# Patient Record
Sex: FEMALE | Race: OTHER | URBAN - METROPOLITAN AREA
[De-identification: names, ages, dates, MRNs, and addresses within clinical notes are randomized per-mention and may not be internally consistent; named-entity substitution may affect disease eponyms.]

---

## 2022-06-09 ENCOUNTER — APPOINTMENT (OUTPATIENT)
Dept: ULTRASOUND IMAGING | Age: 27
End: 2022-06-09
Attending: FAMILY MEDICINE
Payer: MEDICAID

## 2022-06-09 ENCOUNTER — HOSPITAL ENCOUNTER (EMERGENCY)
Age: 27
Discharge: HOME | End: 2022-06-09
Attending: EMERGENCY MEDICINE
Payer: MEDICAID

## 2022-06-09 VITALS
TEMPERATURE: 97.9 F | RESPIRATION RATE: 16 BRPM | HEIGHT: 66 IN | WEIGHT: 185 LBS | OXYGEN SATURATION: 100 % | HEART RATE: 75 BPM | BODY MASS INDEX: 29.73 KG/M2 | DIASTOLIC BLOOD PRESSURE: 78 MMHG | SYSTOLIC BLOOD PRESSURE: 131 MMHG

## 2022-06-09 DIAGNOSIS — O26.891 ABDOMINAL PAIN DURING PREGNANCY IN FIRST TRIMESTER: Primary | ICD-10-CM

## 2022-06-09 DIAGNOSIS — R10.9 ABDOMINAL PAIN DURING PREGNANCY IN FIRST TRIMESTER: Primary | ICD-10-CM

## 2022-06-09 LAB
ALBUMIN SERPL-MCNC: 3.4 G/DL (ref 3.5–5)
ALBUMIN/GLOB SERPL: 0.9 {RATIO} (ref 1.1–2.2)
ALP SERPL-CCNC: 87 U/L (ref 45–117)
ALT SERPL-CCNC: 17 U/L (ref 12–78)
ANION GAP SERPL CALC-SCNC: 6 MMOL/L (ref 5–15)
APPEARANCE UR: CLEAR
AST SERPL-CCNC: 15 U/L (ref 15–37)
BACTERIA URNS QL MICRO: ABNORMAL /HPF
BASOPHILS # BLD: 0.1 K/UL (ref 0–0.1)
BASOPHILS NFR BLD: 1 % (ref 0–1)
BILIRUB SERPL-MCNC: 0.4 MG/DL (ref 0.2–1)
BILIRUB UR QL: NEGATIVE
BUN SERPL-MCNC: 8 MG/DL (ref 6–20)
BUN/CREAT SERPL: 11 (ref 12–20)
CALCIUM SERPL-MCNC: 8.9 MG/DL (ref 8.5–10.1)
CHLORIDE SERPL-SCNC: 109 MMOL/L (ref 97–108)
CO2 SERPL-SCNC: 24 MMOL/L (ref 21–32)
COLOR UR: ABNORMAL
CREAT SERPL-MCNC: 0.75 MG/DL (ref 0.55–1.02)
DIFFERENTIAL METHOD BLD: ABNORMAL
EOSINOPHIL # BLD: 0.2 K/UL (ref 0–0.4)
EOSINOPHIL NFR BLD: 2 % (ref 0–7)
EPITH CASTS URNS QL MICRO: ABNORMAL /LPF
ERYTHROCYTE [DISTWIDTH] IN BLOOD BY AUTOMATED COUNT: 14.6 % (ref 11.5–14.5)
GLOBULIN SER CALC-MCNC: 4 G/DL (ref 2–4)
GLUCOSE SERPL-MCNC: 120 MG/DL (ref 65–100)
GLUCOSE UR STRIP.AUTO-MCNC: NEGATIVE MG/DL
HCG SERPL-ACNC: 1049 MIU/ML (ref 0–6)
HCT VFR BLD AUTO: 37.5 % (ref 35–47)
HGB BLD-MCNC: 12.9 G/DL (ref 11.5–16)
HGB UR QL STRIP: ABNORMAL
IMM GRANULOCYTES # BLD AUTO: 0 K/UL (ref 0–0.04)
IMM GRANULOCYTES NFR BLD AUTO: 0 % (ref 0–0.5)
KETONES UR QL STRIP.AUTO: NEGATIVE MG/DL
LEUKOCYTE ESTERASE UR QL STRIP.AUTO: ABNORMAL
LIPASE SERPL-CCNC: 88 U/L (ref 73–393)
LYMPHOCYTES # BLD: 2.5 K/UL (ref 0.8–3.5)
LYMPHOCYTES NFR BLD: 27 % (ref 12–49)
MCH RBC QN AUTO: 29.3 PG (ref 26–34)
MCHC RBC AUTO-ENTMCNC: 34.4 G/DL (ref 30–36.5)
MCV RBC AUTO: 85 FL (ref 80–99)
MONOCYTES # BLD: 0.4 K/UL (ref 0–1)
MONOCYTES NFR BLD: 4 % (ref 5–13)
NEUTS SEG # BLD: 6.3 K/UL (ref 1.8–8)
NEUTS SEG NFR BLD: 66 % (ref 32–75)
NITRITE UR QL STRIP.AUTO: NEGATIVE
NRBC # BLD: 0 K/UL (ref 0–0.01)
NRBC BLD-RTO: 0 PER 100 WBC
PH UR STRIP: 5 [PH] (ref 5–8)
PLATELET # BLD AUTO: 311 K/UL (ref 150–400)
PMV BLD AUTO: 9.9 FL (ref 8.9–12.9)
POTASSIUM SERPL-SCNC: 3.5 MMOL/L (ref 3.5–5.1)
PROT SERPL-MCNC: 7.4 G/DL (ref 6.4–8.2)
PROT UR STRIP-MCNC: NEGATIVE MG/DL
RBC # BLD AUTO: 4.41 M/UL (ref 3.8–5.2)
RBC #/AREA URNS HPF: ABNORMAL /HPF (ref 0–5)
SODIUM SERPL-SCNC: 139 MMOL/L (ref 136–145)
SP GR UR REFRACTOMETRY: 1.01 (ref 1–1.03)
UR CULT HOLD, URHOLD: NORMAL
UROBILINOGEN UR QL STRIP.AUTO: 0.2 EU/DL (ref 0.2–1)
WBC # BLD AUTO: 9.5 K/UL (ref 3.6–11)
WBC URNS QL MICRO: ABNORMAL /HPF (ref 0–4)

## 2022-06-09 PROCEDURE — 85025 COMPLETE CBC W/AUTO DIFF WBC: CPT

## 2022-06-09 PROCEDURE — 76817 TRANSVAGINAL US OBSTETRIC: CPT

## 2022-06-09 PROCEDURE — 84702 CHORIONIC GONADOTROPIN TEST: CPT

## 2022-06-09 PROCEDURE — 99284 EMERGENCY DEPT VISIT MOD MDM: CPT

## 2022-06-09 PROCEDURE — 80053 COMPREHEN METABOLIC PANEL: CPT

## 2022-06-09 PROCEDURE — 87086 URINE CULTURE/COLONY COUNT: CPT

## 2022-06-09 PROCEDURE — 36415 COLL VENOUS BLD VENIPUNCTURE: CPT

## 2022-06-09 PROCEDURE — 83690 ASSAY OF LIPASE: CPT

## 2022-06-09 PROCEDURE — 76815 OB US LIMITED FETUS(S): CPT

## 2022-06-09 PROCEDURE — 76801 OB US < 14 WKS SINGLE FETUS: CPT

## 2022-06-09 PROCEDURE — 81001 URINALYSIS AUTO W/SCOPE: CPT

## 2022-06-09 NOTE — ED TRIAGE NOTES
Pt arrives to ED for LLQ pain. States started yesterday and today is spotting.  Pt had positive home pregnancy test. LMP 5/1

## 2022-06-09 NOTE — DISCHARGE INSTRUCTIONS
Thank you for allowing us to provide you with medical care today. We realize that you have many choices for your emergency care needs. We thank you for choosing [address] [place] [address]. Please choose us in the future for any continued health care needs. The exam and treatment you received in the emergency department were for an emergent problem and are not intended as complete care. It is important that you follow-up with a doctor. If your symptoms worsen or you do not improve should return to the emergency department. We are available 24 hours a day. Please make an appointment with your health care provider for follow-up of your emergency department visit. Take this sheet with you when you go to your follow-up visit.

## 2022-06-09 NOTE — ED PROVIDER NOTES
Patient is a 61-year-old female with no past medical history presenting with concerns for abdominal pain in early pregnancy. She reports her last menstrual period started on 5/1. She took a home pregnancy test approximately 2 weeks ago and this was positive. She noted that she has had some intermittent abdominal pain over the last 20 days. However, last night she felt that the pain worsened. She then noted some spotting. She reports the spotting was initially clear, but then became pink. She denies saturating multiple pads. She has never been pregnant before. She does have a OB/GYN appointment tomorrow, but her pregnancy has not been confirmed with blood test or ultrasound. No past medical history on file. No past surgical history on file. No family history on file. Social History     Socioeconomic History    Marital status: Not on file     Spouse name: Not on file    Number of children: Not on file    Years of education: Not on file    Highest education level: Not on file   Occupational History    Not on file   Tobacco Use    Smoking status: Not on file    Smokeless tobacco: Not on file   Substance and Sexual Activity    Alcohol use: Not on file    Drug use: Not on file    Sexual activity: Not on file   Other Topics Concern    Not on file   Social History Narrative    Not on file     Social Determinants of Health     Financial Resource Strain:     Difficulty of Paying Living Expenses: Not on file   Food Insecurity:     Worried About Running Out of Food in the Last Year: Not on file    Deb of Food in the Last Year: Not on file   Transportation Needs:     Lack of Transportation (Medical): Not on file    Lack of Transportation (Non-Medical):  Not on file   Physical Activity:     Days of Exercise per Week: Not on file    Minutes of Exercise per Session: Not on file   Stress:     Feeling of Stress : Not on file   Social Connections:     Frequency of Communication with Friends and Family: Not on file    Frequency of Social Gatherings with Friends and Family: Not on file    Attends Jain Services: Not on file    Active Member of Clubs or Organizations: Not on file    Attends Club or Organization Meetings: Not on file    Marital Status: Not on file   Intimate Partner Violence:     Fear of Current or Ex-Partner: Not on file    Emotionally Abused: Not on file    Physically Abused: Not on file    Sexually Abused: Not on file   Housing Stability:     Unable to Pay for Housing in the Last Year: Not on file    Number of [place] in the Last Year: Not on file    Unstable Housing in the Last Year: Not on file         ALLERGIES: Patient has no known allergies. Review of Systems   Constitutional: Negative for unexpected weight change. HENT: Negative for congestion. Eyes: Negative for visual disturbance. Respiratory: Negative for cough, chest tightness and shortness of breath. Cardiovascular: Negative for chest pain. Gastrointestinal: Positive for abdominal pain. Endocrine: Negative for polyuria. Genitourinary: Negative for dysuria and flank pain. Musculoskeletal: Negative for back pain. Skin: Negative for color change. Allergic/Immunologic: Negative for immunocompromised state. Neurological: Negative for dizziness and headaches. Hematological: Negative for adenopathy. Psychiatric/Behavioral: Negative for agitation. Vitals:    06/09/22 1612   BP: 131/78   Pulse: 75   Resp: 16   Temp: 97.9 °F (36.6 °C)   SpO2: 100%   Weight: 83.9 kg (185 lb)   Height: 5' 6\" (1.676 m)            Physical Exam  Vitals and nursing note reviewed. Constitutional:       Appearance: She is well-developed and normal weight. HENT:      Head: Atraumatic. Cardiovascular:      Rate and Rhythm: Normal rate. Pulmonary:      Effort: Pulmonary effort is normal. No respiratory distress. Abdominal:      General: Abdomen is flat.  Bowel sounds are normal.      Palpations: Abdomen is soft. Tenderness: There is abdominal tenderness in the left lower quadrant. There is no right CVA tenderness, left CVA tenderness, guarding or rebound. Negative signs include Matos's sign, Rovsing's sign and McBurney's sign. Hernia: No hernia is present. Skin:     General: Skin is warm and dry. Neurological:      General: No focal deficit present. Mental Status: She is alert and oriented to person, place, and time. Psychiatric:         Mood and Affect: Mood normal.         Behavior: Behavior normal.          MDM  Number of Diagnoses or Management Options  Abdominal pain during pregnancy in first trimester  Diagnosis management comments: Patient presenting with abdominal pain in early pregnancy. Abdominal exam is unremarkable with no rebound or guarding. Patient has stable vital signs, she is not heavily bleeding. Will obtain ultrasound to rule out ectopic pregnancy. Will obtain CBC, CMP, lipase to evaluate for other etiology of abdominal pain. 1840 -ultrasounds revealing intrauterine pregnancy estimation 5 weeks. Remainder of labs unremarkable, no other clear etiology for patient's abdominal pain, normal CBC, normal creatinine, normal bili, normal liver enzymes. Lipase nonelevated. Beta-hCG appropriately correlating. Patient has follow-up scheduled for tomorrow. Encourage patient to keep this follow-up. Discussed my clinical impression(s), any labs and/or radiology results with the patient. I answered any questions and addressed any concerns. Discussed the importance of following up with their primary care physician and/or specialist(s). Discussed signs or symptoms that would warrant return back to the ER for further evaluation. The patient is agreeable with discharge.        Amount and/or Complexity of Data Reviewed  Clinical lab tests: reviewed and ordered  Tests in the radiology section of CPT®: ordered and reviewed Procedures

## 2022-06-10 LAB
BACTERIA SPEC CULT: NORMAL
SERVICE CMNT-IMP: NORMAL

## 2022-07-19 NOTE — PROGRESS NOTES
Current pregnancy history:    Cassidy Joseph is a 27 y.o. female who presents for the evaluation of pregnancy. Patient's last menstrual period was 2022. LMP history:  The date of her LMP is not certain. Her last menstrual period was normal and lasted for 4 to 5 days. A urine pregnancy test was positive 7 weeks ago. She was not on the pill at conception. Based on her LMP, her EDC is 23 and her EGA is 11 weeks,3 days. Her menstrual cycles are regular and occur approximately every 28 days  and range from 3 to 5 days. The last menses lasted the usual number of days. Ultrasound data:  She had an  ultrasound done by the ultrasound tech today which revealed a viable fabian pregnancy with a gestational age of 5 weeks and 1 days giving an EDC of 23. TA ULTRASOUND PERFORMED  A SINGLE VIABLE 11W1D IUP IS SEEN WITH NORMAL CARDIAC RHYTHM. [facility]ST[facility]. A NORMAL PLACENTA IS SEEN. THERE APPEARS TO BE A SUBCHORIONIC HEMORRHAGE THAT IS MEASURING 17 X 5 X 18MM. RIGHT OVARY APPEARS WITHIN NORMAL LIMITS. LEFT OVARY APPEARS WITHIN NORMAL LIMITS. NO FREE FLUID SEEN IN THE CDS. Pregnancy symptoms:    Since her LMP she has experienced  urinary frequency, breast tenderness, and nausea. She has not been vomiting over the last few weeks. Associated signs and symptoms which she denies: dysuria, discharge, vaginal bleeding. She states she has gained weight:  Approximately 8 pounds over the last few weeks. Relevant past pregnancy history:   She has the following pregnancy history: Her last pregnancy was uncomplicated. She has no history of  delivery. Relevant past medical history:(relevant to this pregnancy): noncontributory. Pap/Occupational history:  Last pap smear: last year Results: The patient has never had a pap smear. Her occupation is: currently not employed.      Substance history: negative for alcohol, tobacco and street drugs. Positive for nothing. Exposure history: There is/are no indoor cat/s in the home. She denies close contact with children on a regular basis. She has had chicken pox or the vaccine in the past.   Patient denies issues with domestic violence. Genetic Screening/Teratology Counseling: (Includes patient, baby's father, or anyone in either family with:)  3.  Patient's age >/= 28 at [facility]?-- no  .   2. Thalassemia ([facility], [facility], [address], or  background): MCV<80?--no.     3.  Neural tube defect (meningomyelocele, spina bifida, anencephaly)?--no.   4.  Congenital heart defect?--no.  5.  Down syndrome?--no.   6.  Ravinder-Sachs (Spiritism, [facility])?--no.   7.  Canavan's Disease?--no.   8.  Familial Dysautonomia?--no.   9.  Sickle cell disease or trait ()? --no   The patient has not been tested for sickle trait  10. Hemophilia or other blood disorders?--no. 11.  Muscular dystrophy?--no. 12.  Cystic fibrosis?--no. 13.  [place]'s Chorea?--no. 14.  Mental retardation/autism (if yes was person tested for Fragile X)?--no. 15.  Other inherited genetic or chromosomal disorder?--no. 12.  Maternal metabolic disorder (DM, PKU, etc)?--no. 17.  Patient or FOB with a child with a birth defect not listed above?--no.  17a. Patient or FOB with a birth defect themselves?--no. 18.  Recurrent pregnancy loss, or stillbirth?--no. 19.  Any medications since LMP other than prenatal vitamins (include vitamins,  supplements, OTC meds, drugs, alcohol)?--no. 20.  Any other genetic/environmental exposure to discuss?--no. Infection History:  1. Lives with someone with TB or TB exposed?--no.   2.  Patient or partner has history of genital herpes?--no.  3.  Rash or viral illness since LMP?--no.    4.  History of STD (GC, CT, HPV, syphilis, HIV)? --no   5. Other: OTHER? History reviewed. No pertinent past medical history.   Past Surgical History:   Procedure Laterality Date    HX APPENDECTOMY  2018     Social History     Occupational History    Not on file   Tobacco Use    Smoking status: Never    Smokeless tobacco: Never   Vaping Use    Vaping Use: Never used   Substance and Sexual Activity    Alcohol use: Not Currently    Drug use: Never    Sexual activity: Yes     Partners: Male     Birth control/protection: None     Family History   Problem Relation Age of Onset    Hypertension Mother     Asthma Father     Hypertension Maternal Grandfather        No Known Allergies  Prior to Admission medications    Not on File      OB History    Para Term  AB Living   3       2     SAB IAB Ectopic Molar Multiple Live Births     2              # Outcome Date GA Lbr Camacho/2nd Weight Sex Delivery Anes PTL Lv   3 Current            2 IAB            1 IAB                Review of Systems: History obtained from the patient  Constitutional: negative for weight loss, fever, night sweats  HEENT: negative for hearing loss, earache, congestion, snoring, sorethroat  CV: negative for chest pain, palpitations, edema  Resp: negative for cough, shortness of breath, wheezing  Breast: negative for breast lumps, nipple discharge, galactorrhea  GI: negative for change in bowel habits, abdominal pain, black or bloody stools  : negative for frequency, dysuria, hematuria, vaginal discharge  MSK: negative for back pain, joint pain, muscle pain  Skin: negative for itching, rash, hives  Neuro: negative for dizziness, headache, confusion, weakness  Psych: negative for anxiety, depression, change in mood  Heme/lymph: negative for bleeding, bruising, pallor    Objective:  Visit Vitals  /78   Wt 199 lb 6.4 oz (90.4 kg)   LMP 2022   BMI 32.18 kg/m²       Physical Exam:   PHYSICAL EXAMINATION    Constitutional  Appearance: well-nourished, well developed, alert, in no acute distress    HENT  Head  Face: appears normal  Eyes: appear normal  Ears: normal  Mouth: normal  Lips: no lesions    Neck  Inspection/Palpation: normal appearance, no masses or tenderness  Lymph Nodes: no lymphadenopathy present  Thyroid: gland size normal, nontender, no nodules or masses present on palpation    Chest  Respiratory Effort: breathing unlabored  Auscultation: normal breath sounds    Cardiovascular  Heart: Auscultation: regular rate and rhythm without murmur    Breasts  Inspection of Breasts: breasts symmetrical, no skin changes, no discharge present, nipple appearance normal, no skin retraction present  Palpation of Breasts and Axillae: no masses present on palpation, no breast tenderness  Axillary Lymph Nodes: no lymphadenopathy present    Gastrointestinal  Abdominal Examination: abdomen non-tender to palpation, normal bowel sounds, no masses present  Liver and spleen: no hepatomegaly present, spleen not palpable  Hernias: no hernias identified    Genitourinary  External Genitalia: normal appearance for age, no discharge present, no tenderness present, no inflammatory lesions present, no masses present, no atrophy present  Vagina: normal vaginal vault without central or paravaginal defects, no discharge present, no inflammatory lesions present, no masses present  Bladder: non-tender to palpation  Urethra: appears normal  Cervix: normal   Uterus: enlarged, normal shape, soft  Adnexa: no adnexal tenderness present, no adnexal masses present  Perineum: perineum within normal limits, no evidence of trauma, no rashes or skin lesions present  Anus: anus within normal limits, no hemorrhoids present  Inguinal Lymph Nodes: no lymphadenopathy present    Skin  General Inspection: no rash, no lesions identified    Neurologic/Psychiatric  Mental Status:  Orientation: grossly oriented to person, place and time  Mood and Affect: mood normal, affect appropriate    Assessment:   Intrauterine pregnancy with the following problems identified:   EDC 2/7/2023 by US (unsure LMP)  NIPTS? Horizon?   Covid vaccine - needs booster  Low dose ASA        Plan:     Offered CF testing, CVS, Nuchal Translucency, MSAFP, amnio, and discussed NIPT  Course of pregnancy discussed including visit schedule, routine U/S, glucola testing, etc.  Avoid alcoholic beverages and illicit/recreational drugs use  Take prenatal vitamins or folic acid daily. Hospital and practice style discussed with coverage system. Discussed nutrition, toxoplasmosis precautions, sexual activity, exercise, need for influenza vaccine, environmental and work hazards, travel advice, screen for domestic violence, need for seat belts. Discussed seafood, unpasteurized dairy products, deli meat, artificial sweeteners, and caffeine. Information on prenatal classes/breastfeeding given. Information on circumcision given  Patient encouraged not to smoke. Discussed current prescription drug use. Given medication list.  Discussed the use of over the counter medications and chemicals. Route of delivery discussed, including risks, benefits, and alternatives of  versus repeat LTCS. Pt understands risk of hemorrhage during pregnancy and post delivery and would accept blood products if necessary in life-threatening emergencies      Handouts given to pt.

## 2022-07-20 ENCOUNTER — INITIAL PRENATAL (OUTPATIENT)
Dept: OBGYN CLINIC | Age: 27
End: 2022-07-20

## 2022-07-20 VITALS — BODY MASS INDEX: 32.18 KG/M2 | WEIGHT: 199.4 LBS | DIASTOLIC BLOOD PRESSURE: 78 MMHG | SYSTOLIC BLOOD PRESSURE: 131 MMHG

## 2022-07-20 DIAGNOSIS — Z34.91 ENCOUNTER FOR SUPERVISION OF NORMAL PREGNANCY IN FIRST TRIMESTER, UNSPECIFIED GRAVIDITY: ICD-10-CM

## 2022-07-20 DIAGNOSIS — Z34.80 SUPERVISION OF OTHER NORMAL PREGNANCY, ANTEPARTUM: Primary | ICD-10-CM

## 2022-07-20 LAB
ANTIBODY SCREEN, EXTERNAL: NEGATIVE
HBSAG, EXTERNAL: NEGATIVE
HEPATITIS C AB,   EXT: NEGATIVE
HIV, EXTERNAL: NEGATIVE
RUBELLA, EXTERNAL: NORMAL
T. PALLIDUM, EXTERNAL: NON REACTIVE
TYPE, ABO & RH, EXTERNAL: NORMAL

## 2022-07-20 PROCEDURE — 0502F SUBSEQUENT PRENATAL CARE: CPT | Performed by: OBSTETRICS & GYNECOLOGY

## 2022-07-21 ENCOUNTER — PATIENT MESSAGE (OUTPATIENT)
Dept: OBGYN CLINIC | Age: 27
End: 2022-07-21

## 2022-07-21 LAB
ABO + RH BLD: NORMAL
BLOOD BANK CMNT PATIENT-IMP: NORMAL
BLOOD GROUP ANTIBODIES SERPL: NORMAL
ERYTHROCYTE [DISTWIDTH] IN BLOOD BY AUTOMATED COUNT: 15.5 % (ref 11.5–14.5)
HBV SURFACE AG SER QL: <0.1 INDEX
HBV SURFACE AG SER QL: NEGATIVE
HCT VFR BLD AUTO: 36.2 % (ref 35–47)
HCV AB SERPL QL IA: NONREACTIVE
HGB BLD-MCNC: 12 G/DL (ref 11.5–16)
HIV 1+2 AB+HIV1 P24 AG SERPL QL IA: NONREACTIVE
HIV12 RESULT COMMENT, HHIVC: NORMAL
MCH RBC QN AUTO: 28.9 PG (ref 26–34)
MCHC RBC AUTO-ENTMCNC: 33.1 G/DL (ref 30–36.5)
MCV RBC AUTO: 87.2 FL (ref 80–99)
NRBC # BLD: 0 K/UL (ref 0–0.01)
NRBC BLD-RTO: 0 PER 100 WBC
PLATELET # BLD AUTO: 315 K/UL (ref 150–400)
PMV BLD AUTO: 10.3 FL (ref 8.9–12.9)
RBC # BLD AUTO: 4.15 M/UL (ref 3.8–5.2)
RUBV IGG SER-IMP: REACTIVE
RUBV IGG SERPL IA-ACNC: 26.1 IU/ML
SPECIMEN EXP DATE BLD: NORMAL
WBC # BLD AUTO: 11.7 K/UL (ref 3.6–11)

## 2022-07-22 LAB — T PALLIDUM AB SER QL IA: NON REACTIVE

## 2022-07-23 LAB
BACTERIA SPEC CULT: NORMAL
SERVICE CMNT-IMP: NORMAL

## 2022-07-24 LAB
A VAGINAE DNA VAG QL NAA+PROBE: ABNORMAL SCORE
BVAB2 DNA VAG QL NAA+PROBE: ABNORMAL SCORE
C ALBICANS DNA VAG QL NAA+PROBE: POSITIVE
C GLABRATA DNA VAG QL NAA+PROBE: NEGATIVE
C TRACH DNA VAG QL NAA+PROBE: NEGATIVE
MEGA1 DNA VAG QL NAA+PROBE: ABNORMAL SCORE
N GONORRHOEA DNA VAG QL NAA+PROBE: NEGATIVE
SPECIMEN STATUS REPORT, ROLRST: NORMAL
T VAGINALIS DNA VAG QL NAA+PROBE: POSITIVE

## 2022-07-26 RX ORDER — METRONIDAZOLE 500 MG/1
500 TABLET ORAL 2 TIMES DAILY
Qty: 14 TABLET | Refills: 0 | Status: SHIPPED | OUTPATIENT
Start: 2022-07-26 | End: 2022-08-02

## 2022-07-26 RX ORDER — TERCONAZOLE 8 MG/G
1 CREAM VAGINAL
Qty: 20 G | Refills: 0 | Status: SHIPPED | OUTPATIENT
Start: 2022-07-26

## 2022-08-01 PROBLEM — Z34.80 SUPERVISION OF OTHER NORMAL PREGNANCY, ANTEPARTUM: Status: ACTIVE | Noted: 2022-08-01

## 2022-08-01 LAB
CYTOLOGIST CVX/VAG CYTO: NORMAL
CYTOLOGY CVX/VAG DOC CYTO: NORMAL
CYTOLOGY CVX/VAG DOC THIN PREP: NORMAL
DX ICD CODE: NORMAL
LABCORP, 190119: NORMAL
Lab: NORMAL
OTHER STN SPEC: NORMAL
STAT OF ADQ CVX/VAG CYTO-IMP: NORMAL

## 2022-08-03 ENCOUNTER — HOSPITAL ENCOUNTER (OUTPATIENT)
Dept: PERINATAL CARE | Age: 27
Discharge: HOME | End: 2022-08-03
Attending: OBSTETRICS & GYNECOLOGY
Payer: MEDICAID

## 2022-08-03 PROCEDURE — 76813 OB US NUCHAL MEAS 1 GEST: CPT | Performed by: OBSTETRICS & GYNECOLOGY

## 2022-08-17 ENCOUNTER — ROUTINE PRENATAL (OUTPATIENT)
Dept: OBGYN CLINIC | Age: 27
End: 2022-08-17

## 2022-08-17 VITALS — DIASTOLIC BLOOD PRESSURE: 79 MMHG | SYSTOLIC BLOOD PRESSURE: 123 MMHG | WEIGHT: 202 LBS | BODY MASS INDEX: 32.6 KG/M2

## 2022-08-17 DIAGNOSIS — Z34.91 ENCOUNTER FOR SUPERVISION OF NORMAL PREGNANCY IN FIRST TRIMESTER, UNSPECIFIED GRAVIDITY: Primary | ICD-10-CM

## 2022-08-17 DIAGNOSIS — Z34.80 SUPERVISION OF OTHER NORMAL PREGNANCY, ANTEPARTUM: ICD-10-CM

## 2022-08-17 PROCEDURE — 0502F SUBSEQUENT PRENATAL CARE: CPT | Performed by: OBSTETRICS & GYNECOLOGY

## 2022-08-17 NOTE — PROGRESS NOTES
Retest for trich - both treated  AFP today  US in 4 weeks    Horizon pos SC trait - advised needs to bring FOB for testing next visit

## 2022-08-17 NOTE — PROGRESS NOTES
No uterine anomaly on MFM scan   [facility] resolved   Trich treated - retest next visit  Yeast-treated  NIPTS normal male

## 2022-08-19 LAB
AFP INTERP SERPL-IMP: NORMAL
AFP INTERP SERPL-IMP: NORMAL
AFP MOM SERPL: 1.59
AFP SERPL-MCNC: 40.3 NG/ML
AGE AT DELIVERY: 25.2 YR
COMMENT, 018013: NORMAL
GA METHOD: NORMAL
GA: 15 WEEKS
IDDM PATIENT QL: NO
MULTIPLE PREGNANCY: NO
NEURAL TUBE DEFECT RISK FETUS: 2155 %
RESULTS, 017004: NORMAL

## 2022-08-20 LAB
C TRACH RRNA SPEC QL NAA+PROBE: NEGATIVE
N GONORRHOEA RRNA SPEC QL NAA+PROBE: NEGATIVE
T VAGINALIS RRNA SPEC QL NAA+PROBE: NEGATIVE

## 2022-09-21 ENCOUNTER — ROUTINE PRENATAL (OUTPATIENT)
Dept: OBGYN CLINIC | Age: 27
End: 2022-09-21

## 2022-09-21 VITALS — DIASTOLIC BLOOD PRESSURE: 75 MMHG | BODY MASS INDEX: 34.22 KG/M2 | SYSTOLIC BLOOD PRESSURE: 130 MMHG | WEIGHT: 212 LBS

## 2022-09-21 DIAGNOSIS — O28.3 ABNORMAL ULTRASONIC FINDING ON ANTENATAL SCREENING OF MOTHER: ICD-10-CM

## 2022-09-21 DIAGNOSIS — Z34.91 ENCOUNTER FOR SUPERVISION OF NORMAL PREGNANCY IN FIRST TRIMESTER, UNSPECIFIED GRAVIDITY: ICD-10-CM

## 2022-09-21 DIAGNOSIS — O26.899 VAGINAL DISCHARGE DURING PREGNANCY, ANTEPARTUM: Primary | ICD-10-CM

## 2022-09-21 DIAGNOSIS — N89.8 VAGINAL DISCHARGE DURING PREGNANCY, ANTEPARTUM: Primary | ICD-10-CM

## 2022-09-21 PROCEDURE — 0502F SUBSEQUENT PRENATAL CARE: CPT | Performed by: OBSTETRICS & GYNECOLOGY

## 2022-09-21 RX ORDER — TERCONAZOLE 8 MG/G
1 CREAM VAGINAL
Qty: 20 G | Refills: 0 | Status: SHIPPED | OUTPATIENT
Start: 2022-09-21

## 2022-09-21 NOTE — PROGRESS NOTES
US today marginal cord insertion.  IVEF - see MFM  Baby moving    Complains of green discharge - test for trich was neg last visit  Yeast discharge in vagina    FOB getting tested today

## 2022-09-21 NOTE — PROGRESS NOTES
No uterine anomaly on MFM scan   [facility] resolved   Trich treated - retest next visit- neg  Yeast-treated  NIPTS normal male  Horizon pos sickle trait - test FOB- redraw Horizon at next visit  AFP- neg

## 2022-09-24 LAB
A VAGINAE DNA VAG QL NAA+PROBE: ABNORMAL SCORE
BVAB2 DNA VAG QL NAA+PROBE: ABNORMAL SCORE
C ALBICANS DNA VAG QL NAA+PROBE: POSITIVE
C GLABRATA DNA VAG QL NAA+PROBE: NEGATIVE
C TRACH DNA VAG QL NAA+PROBE: NEGATIVE
MEGA1 DNA VAG QL NAA+PROBE: ABNORMAL SCORE
N GONORRHOEA DNA VAG QL NAA+PROBE: NEGATIVE
T VAGINALIS DNA VAG QL NAA+PROBE: NEGATIVE

## 2022-09-26 RX ORDER — METRONIDAZOLE 500 MG/1
500 TABLET ORAL 2 TIMES DAILY
Qty: 14 TABLET | Refills: 0 | Status: SHIPPED | OUTPATIENT
Start: 2022-09-26 | End: 2022-10-03

## 2022-09-26 RX ORDER — TERCONAZOLE 8 MG/G
1 CREAM VAGINAL
Qty: 20 G | Refills: 1 | Status: SHIPPED | OUTPATIENT
Start: 2022-09-26

## 2022-10-19 ENCOUNTER — HOSPITAL ENCOUNTER (OUTPATIENT)
Dept: PERINATAL CARE | Age: 27
Discharge: HOME | End: 2022-10-19
Attending: OBSTETRICS & GYNECOLOGY
Payer: MEDICAID

## 2022-10-19 ENCOUNTER — ROUTINE PRENATAL (OUTPATIENT)
Dept: OBGYN CLINIC | Age: 27
End: 2022-10-19
Payer: MEDICAID

## 2022-10-19 VITALS
SYSTOLIC BLOOD PRESSURE: 119 MMHG | HEIGHT: 66 IN | DIASTOLIC BLOOD PRESSURE: 73 MMHG | WEIGHT: 223.4 LBS | BODY MASS INDEX: 35.9 KG/M2

## 2022-10-19 DIAGNOSIS — Z34.80 SUPERVISION OF OTHER NORMAL PREGNANCY, ANTEPARTUM: Primary | ICD-10-CM

## 2022-10-19 DIAGNOSIS — Z23 ENCOUNTER FOR IMMUNIZATION: ICD-10-CM

## 2022-10-19 PROCEDURE — 90686 IIV4 VACC NO PRSV 0.5 ML IM: CPT

## 2022-10-19 PROCEDURE — 76811 OB US DETAILED SNGL FETUS: CPT | Performed by: OBSTETRICS & GYNECOLOGY

## 2022-10-19 PROCEDURE — 0502F SUBSEQUENT PRENATAL CARE: CPT | Performed by: OBSTETRICS & GYNECOLOGY

## 2022-10-19 PROCEDURE — 90471 IMMUNIZATION ADMIN: CPT

## 2022-10-19 NOTE — PROGRESS NOTES
No uterine anomaly on MFM scan   [facility] resolved   Trich treated - retest next visit- neg  Yeast-treated  NIPTS normal male  Horizon pos sickle trait - test FOB- redraw Horizon at next visit- negative  AFP- neg

## 2022-10-19 NOTE — PROGRESS NOTES
Baby moving  No contractions or leaking  Saw MFM today - has no fu scheduled - apparently had IVEF  Flu vaccine today  glucola next visit    Due to language barrier, an  was present during the history-taking and subsequent discussion (and for part of the physical exam) with this patient.

## 2022-11-14 ENCOUNTER — ROUTINE PRENATAL (OUTPATIENT)
Dept: OBGYN CLINIC | Age: 27
End: 2022-11-14
Payer: MEDICAID

## 2022-11-14 VITALS — SYSTOLIC BLOOD PRESSURE: 127 MMHG | WEIGHT: 228.4 LBS | DIASTOLIC BLOOD PRESSURE: 77 MMHG | BODY MASS INDEX: 36.86 KG/M2

## 2022-11-14 DIAGNOSIS — Z34.80 SUPERVISION OF OTHER NORMAL PREGNANCY, ANTEPARTUM: Primary | ICD-10-CM

## 2022-11-14 PROCEDURE — 90471 IMMUNIZATION ADMIN: CPT | Performed by: OBSTETRICS & GYNECOLOGY

## 2022-11-14 PROCEDURE — 0502F SUBSEQUENT PRENATAL CARE: CPT | Performed by: OBSTETRICS & GYNECOLOGY

## 2022-11-14 PROCEDURE — 90715 TDAP VACCINE 7 YRS/> IM: CPT

## 2022-11-14 NOTE — PROGRESS NOTES
EDC 2/7/23 by US  No uterine anomaly on MFM scan   [facility] resolved   Trich treated - retest next visit- neg  Yeast-treated  NIPTS normal male  Horizon pos sickle trait - test FOB- redraw Horizon at next visit- negative  AFP- neg  Low dose ASA  Labs checked TH  Flu vaccine 10/19/22

## 2022-11-15 LAB
ERYTHROCYTE [DISTWIDTH] IN BLOOD BY AUTOMATED COUNT: 14.2 % (ref 11.5–14.5)
GLUCOSE 1H P 100 G GLC PO SERPL-MCNC: 90 MG/DL (ref 65–140)
HCT VFR BLD AUTO: 35.2 % (ref 35–47)
HGB BLD-MCNC: 11.6 G/DL (ref 11.5–16)
MCH RBC QN AUTO: 31.4 PG (ref 26–34)
MCHC RBC AUTO-ENTMCNC: 33 G/DL (ref 30–36.5)
MCV RBC AUTO: 95.1 FL (ref 80–99)
NRBC # BLD: 0 K/UL (ref 0–0.01)
NRBC BLD-RTO: 0 PER 100 WBC
PLATELET # BLD AUTO: 246 K/UL (ref 150–400)
PMV BLD AUTO: 10.5 FL (ref 8.9–12.9)
RBC # BLD AUTO: 3.7 M/UL (ref 3.8–5.2)
WBC # BLD AUTO: 15 K/UL (ref 3.6–11)

## 2022-11-28 ENCOUNTER — ROUTINE PRENATAL (OUTPATIENT)
Dept: OBGYN CLINIC | Age: 27
End: 2022-11-28
Payer: MEDICAID

## 2022-11-28 VITALS — BODY MASS INDEX: 37.48 KG/M2 | SYSTOLIC BLOOD PRESSURE: 119 MMHG | WEIGHT: 232.2 LBS | DIASTOLIC BLOOD PRESSURE: 74 MMHG

## 2022-11-28 DIAGNOSIS — Z3A.29 29 WEEKS GESTATION OF PREGNANCY: ICD-10-CM

## 2022-11-28 DIAGNOSIS — O99.213 OBESITY AFFECTING PREGNANCY IN THIRD TRIMESTER: ICD-10-CM

## 2022-11-28 DIAGNOSIS — Z34.80 SUPERVISION OF OTHER NORMAL PREGNANCY, ANTEPARTUM: Primary | ICD-10-CM

## 2022-11-28 PROCEDURE — 0502F SUBSEQUENT PRENATAL CARE: CPT | Performed by: OBSTETRICS & GYNECOLOGY

## 2022-11-28 NOTE — PROGRESS NOTES
EDC 2/7/23 by US  No uterine anomaly on MFM scan   [facility] resolved   Trich treated - retest next visit- neg  Yeast-treated  NIPTS normal male  Horizon pos sickle trait - test FOB- redraw Horizon at next visit- negative  AFP- neg  Low dose ASA  Labs checked TH  Flu vaccine 10/19/22  1hr GTT- normal

## 2022-12-08 ENCOUNTER — HOSPITAL ENCOUNTER (OUTPATIENT)
Dept: PERINATAL CARE | Age: 27
Discharge: HOME | End: 2022-12-08
Attending: OBSTETRICS & GYNECOLOGY
Payer: MEDICAID

## 2022-12-08 PROCEDURE — 76816 OB US FOLLOW-UP PER FETUS: CPT | Performed by: OBSTETRICS & GYNECOLOGY

## 2022-12-12 ENCOUNTER — ROUTINE PRENATAL (OUTPATIENT)
Dept: OBGYN CLINIC | Age: 27
End: 2022-12-12
Payer: MEDICAID

## 2022-12-12 VITALS — BODY MASS INDEX: 38.06 KG/M2 | SYSTOLIC BLOOD PRESSURE: 114 MMHG | DIASTOLIC BLOOD PRESSURE: 73 MMHG | WEIGHT: 235.8 LBS

## 2022-12-12 DIAGNOSIS — Z34.80 SUPERVISION OF OTHER NORMAL PREGNANCY, ANTEPARTUM: Primary | ICD-10-CM

## 2022-12-12 DIAGNOSIS — O40.3XX0 POLYHYDRAMNIOS IN THIRD TRIMESTER COMPLICATION, SINGLE OR UNSPECIFIED FETUS: ICD-10-CM

## 2022-12-12 DIAGNOSIS — O99.213 OBESITY AFFECTING PREGNANCY IN THIRD TRIMESTER: ICD-10-CM

## 2022-12-12 PROCEDURE — 0502F SUBSEQUENT PRENATAL CARE: CPT | Performed by: OBSTETRICS & GYNECOLOGY

## 2022-12-12 NOTE — PROGRESS NOTES
Patient had ultrasound at [facility]. AC greater than the 95th percentile. Polyhydramnios    Baby moving  Follow-up 2 weeks. Patient has [facility] follow-up in 2 weeks.

## 2022-12-12 NOTE — PROGRESS NOTES
EDC 2/7/23 by US  No uterine anomaly on MFM scan   [facility] resolved   Trich treated - retest next visit- neg  Yeast-treated  NIPTS normal male  Horizon pos sickle trait - test FOB- redraw Horizon at next visit- negative  AFP- neg  Low dose ASA  Labs checked TH  Flu vaccine 10/19/22  1hr GTT- normal

## 2022-12-22 ENCOUNTER — HOSPITAL ENCOUNTER (OUTPATIENT)
Dept: PERINATAL CARE | Age: 27
Discharge: HOME | End: 2022-12-22
Attending: OBSTETRICS & GYNECOLOGY
Payer: MEDICAID

## 2022-12-22 PROCEDURE — 76819 FETAL BIOPHYS PROFIL W/O NST: CPT | Performed by: OBSTETRICS & GYNECOLOGY

## 2022-12-27 ENCOUNTER — ROUTINE PRENATAL (OUTPATIENT)
Dept: OBGYN CLINIC | Age: 27
End: 2022-12-27
Payer: MEDICAID

## 2022-12-27 VITALS
BODY MASS INDEX: 38.41 KG/M2 | DIASTOLIC BLOOD PRESSURE: 70 MMHG | WEIGHT: 238 LBS | HEART RATE: 92 BPM | SYSTOLIC BLOOD PRESSURE: 110 MMHG

## 2022-12-27 DIAGNOSIS — O40.3XX0 POLYHYDRAMNIOS IN THIRD TRIMESTER COMPLICATION, SINGLE OR UNSPECIFIED FETUS: ICD-10-CM

## 2022-12-27 DIAGNOSIS — Z3A.34 34 WEEKS GESTATION OF PREGNANCY: ICD-10-CM

## 2022-12-27 DIAGNOSIS — O99.213 OBESITY AFFECTING PREGNANCY IN THIRD TRIMESTER: ICD-10-CM

## 2022-12-27 DIAGNOSIS — Z34.80 SUPERVISION OF OTHER NORMAL PREGNANCY, ANTEPARTUM: Primary | ICD-10-CM

## 2022-12-27 PROCEDURE — 0502F SUBSEQUENT PRENATAL CARE: CPT | Performed by: OBSTETRICS & GYNECOLOGY

## 2023-01-05 ENCOUNTER — HOSPITAL ENCOUNTER (OUTPATIENT)
Dept: PERINATAL CARE | Age: 28
Discharge: HOME | End: 2023-01-05
Attending: OBSTETRICS & GYNECOLOGY
Payer: MEDICAID

## 2023-01-05 PROCEDURE — 76816 OB US FOLLOW-UP PER FETUS: CPT | Performed by: OBSTETRICS & GYNECOLOGY

## 2023-01-11 ENCOUNTER — ROUTINE PRENATAL (OUTPATIENT)
Dept: OBGYN CLINIC | Age: 28
End: 2023-01-11
Payer: MEDICAID

## 2023-01-11 VITALS — BODY MASS INDEX: 39.32 KG/M2 | DIASTOLIC BLOOD PRESSURE: 74 MMHG | WEIGHT: 243.6 LBS | SYSTOLIC BLOOD PRESSURE: 110 MMHG

## 2023-01-11 DIAGNOSIS — Z3A.36 36 WEEKS GESTATION OF PREGNANCY: ICD-10-CM

## 2023-01-11 DIAGNOSIS — O99.213 OBESITY AFFECTING PREGNANCY IN THIRD TRIMESTER: ICD-10-CM

## 2023-01-11 DIAGNOSIS — Z34.80 SUPERVISION OF OTHER NORMAL PREGNANCY, ANTEPARTUM: Primary | ICD-10-CM

## 2023-01-11 LAB — GRBS, EXTERNAL: NEGATIVE

## 2023-01-11 PROCEDURE — 0502F SUBSEQUENT PRENATAL CARE: CPT | Performed by: OBSTETRICS & GYNECOLOGY

## 2023-01-11 NOTE — PROGRESS NOTES
Baby moving  Released from [facility] - will try to get BPP here  GBS done  Disc labor precautions

## 2023-01-11 NOTE — PROGRESS NOTES
EDC 2/7/23 by US  No uterine anomaly on MFM scan   [facility] resolved   Trich treated - retest next visit- neg  Yeast-treated  NIPTS normal male  Horizon pos sickle trait - test FOB- redraw Horizon at next visit- negative  AFP- neg  Low dose ASA  Labs checked TH  Flu vaccine 10/19/22  1hr GTT- normal  31 weeks: poly 75%tile, AC>95%tile

## 2023-01-15 LAB
B-HEM STREP SPEC QL CULT: NEGATIVE
SPECIMEN STATUS REPORT, ROLRST: NORMAL

## 2023-01-24 ENCOUNTER — ROUTINE PRENATAL (OUTPATIENT)
Dept: OBGYN CLINIC | Age: 28
End: 2023-01-24

## 2023-01-24 VITALS — SYSTOLIC BLOOD PRESSURE: 121 MMHG | DIASTOLIC BLOOD PRESSURE: 75 MMHG | WEIGHT: 246.6 LBS | BODY MASS INDEX: 39.8 KG/M2

## 2023-01-24 DIAGNOSIS — O99.213 OBESITY AFFECTING PREGNANCY IN THIRD TRIMESTER: Primary | ICD-10-CM

## 2023-01-24 DIAGNOSIS — Z34.80 SUPERVISION OF OTHER NORMAL PREGNANCY, ANTEPARTUM: ICD-10-CM

## 2023-01-24 DIAGNOSIS — Z3A.38 38 WEEKS GESTATION OF PREGNANCY: ICD-10-CM

## 2023-01-24 PROCEDURE — 0502F SUBSEQUENT PRENATAL CARE: CPT | Performed by: OBSTETRICS & GYNECOLOGY

## 2023-01-24 NOTE — PROGRESS NOTES
EDC 2/7/23 by US  No uterine anomaly on MFM scan   [facility] resolved   Trich treated - retest next visit- neg  Yeast-treated  NIPTS normal male  Horizon pos sickle trait - test FOB- redraw Horizon at next visit- negative  AFP- neg  Low dose ASA  Labs checked TH  Flu vaccine 10/19/22  1hr GTT- normal  31 weeks: poly 75%tile, AC>95%tile    GBS negative

## 2023-01-25 RX ORDER — TERCONAZOLE 8 MG/G
1 CREAM VAGINAL
Qty: 20 G | Refills: 0 | Status: SHIPPED | OUTPATIENT
Start: 2023-01-25

## 2023-01-30 ENCOUNTER — ROUTINE PRENATAL (OUTPATIENT)
Dept: OBGYN CLINIC | Age: 28
End: 2023-01-30
Payer: MEDICAID

## 2023-01-30 VITALS — SYSTOLIC BLOOD PRESSURE: 128 MMHG | DIASTOLIC BLOOD PRESSURE: 76 MMHG | WEIGHT: 248.8 LBS | BODY MASS INDEX: 40.16 KG/M2

## 2023-01-30 DIAGNOSIS — O99.213 MATERNAL OBESITY SYNDROME IN THIRD TRIMESTER: ICD-10-CM

## 2023-01-30 DIAGNOSIS — Z34.80 SUPERVISION OF OTHER NORMAL PREGNANCY, ANTEPARTUM: Primary | ICD-10-CM

## 2023-01-30 DIAGNOSIS — Z3A.39 39 WEEKS GESTATION OF PREGNANCY: ICD-10-CM

## 2023-01-30 PROCEDURE — 0502F SUBSEQUENT PRENATAL CARE: CPT | Performed by: OBSTETRICS & GYNECOLOGY

## 2023-01-30 RX ORDER — PRENATAL VIT W/ FE FUMARATE-FA TAB 27-0.8 MG 27-0.8 MG
TAB ORAL
Status: ON HOLD | COMMUNITY
Start: 2022-11-17

## 2023-01-30 NOTE — PROGRESS NOTES
EDC 2/7/23 by US  No uterine anomaly on MFM scan   [facility] resolved   Trich treated - retest next visit- neg  Yeast-treated  NIPTS normal male  Horizon pos sickle trait - test FOB- redraw Horizon at next visit- negative  AFP- neg  Low dose ASA  Labs checked TH  Flu vaccine 10/19/22  1hr GTT- normal  31 weeks: poly 75%tile, AC>95%tile    GBS negative  IOL 2/6/23.  [email or web address]

## 2023-01-30 NOTE — PROGRESS NOTES
Baby moving  Davis 2/5 as planned    Cervix closed - vtx floating  EFW 75%tile at last [address] [facility]

## 2023-02-05 ENCOUNTER — HOSPITAL ENCOUNTER (INPATIENT)
Age: 28
LOS: 3 days | Discharge: HOME | DRG: 560 | End: 2023-02-08
Attending: OBSTETRICS & GYNECOLOGY | Admitting: OBSTETRICS & GYNECOLOGY
Payer: MEDICAID

## 2023-02-05 DIAGNOSIS — Z3A.40 40 WEEKS GESTATION OF PREGNANCY: ICD-10-CM

## 2023-02-05 DIAGNOSIS — Z34.90 ENCOUNTER FOR INDUCTION OF LABOR: Primary | ICD-10-CM

## 2023-02-05 LAB
BASOPHILS # BLD: 0 K/UL (ref 0–0.1)
BASOPHILS NFR BLD: 0 % (ref 0–1)
DIFFERENTIAL METHOD BLD: ABNORMAL
EOSINOPHIL # BLD: 0.1 K/UL (ref 0–0.4)
EOSINOPHIL NFR BLD: 1 % (ref 0–7)
ERYTHROCYTE [DISTWIDTH] IN BLOOD BY AUTOMATED COUNT: 13.4 % (ref 11.5–14.5)
HCT VFR BLD AUTO: 34.7 % (ref 35–47)
HGB BLD-MCNC: 12.1 G/DL (ref 11.5–16)
IMM GRANULOCYTES # BLD AUTO: 0 K/UL (ref 0–0.04)
IMM GRANULOCYTES NFR BLD AUTO: 0 % (ref 0–0.5)
LYMPHOCYTES # BLD: 2 K/UL (ref 0.8–3.5)
LYMPHOCYTES NFR BLD: 19 % (ref 12–49)
MCH RBC QN AUTO: 31.8 PG (ref 26–34)
MCHC RBC AUTO-ENTMCNC: 34.9 G/DL (ref 30–36.5)
MCV RBC AUTO: 91.3 FL (ref 80–99)
MONOCYTES # BLD: 0.6 K/UL (ref 0–1)
MONOCYTES NFR BLD: 6 % (ref 5–13)
NEUTS SEG # BLD: 7.9 K/UL (ref 1.8–8)
NEUTS SEG NFR BLD: 74 % (ref 32–75)
NRBC # BLD: 0 K/UL (ref 0–0.01)
NRBC BLD-RTO: 0 PER 100 WBC
PLATELET # BLD AUTO: 251 K/UL (ref 150–400)
PMV BLD AUTO: 10.8 FL (ref 8.9–12.9)
RBC # BLD AUTO: 3.8 M/UL (ref 3.8–5.2)
WBC # BLD AUTO: 10.7 K/UL (ref 3.6–11)

## 2023-02-05 PROCEDURE — 36415 COLL VENOUS BLD VENIPUNCTURE: CPT

## 2023-02-05 PROCEDURE — 59200 INSERT CERVICAL DILATOR: CPT | Performed by: OBSTETRICS & GYNECOLOGY

## 2023-02-05 PROCEDURE — 77030028565 HC CATH CERV RIPNG BLN COOK -B

## 2023-02-05 PROCEDURE — 75410000002 HC LABOR FEE PER 1 HR: Performed by: OBSTETRICS & GYNECOLOGY

## 2023-02-05 PROCEDURE — 2709999900 HC NON-CHARGEABLE SUPPLY

## 2023-02-05 PROCEDURE — 3E0DXGC INTRODUCTION OF OTHER THERAPEUTIC SUBSTANCE INTO MOUTH AND PHARYNX, EXTERNAL APPROACH: ICD-10-PCS | Performed by: OBSTETRICS & GYNECOLOGY

## 2023-02-05 PROCEDURE — 85025 COMPLETE CBC W/AUTO DIFF WBC: CPT

## 2023-02-05 PROCEDURE — 65270000029 HC RM PRIVATE

## 2023-02-05 RX ORDER — SODIUM CHLORIDE 0.9 % (FLUSH) 0.9 %
5-40 SYRINGE (ML) INJECTION AS NEEDED
Status: DISCONTINUED | OUTPATIENT
Start: 2023-02-05 | End: 2023-02-06

## 2023-02-05 RX ORDER — OXYTOCIN/RINGER'S LACTATE 30/500 ML
87.3 PLASTIC BAG, INJECTION (ML) INTRAVENOUS AS NEEDED
Status: COMPLETED | OUTPATIENT
Start: 2023-02-05 | End: 2023-02-06

## 2023-02-05 RX ORDER — ONDANSETRON 2 MG/ML
4 INJECTION INTRAMUSCULAR; INTRAVENOUS
Status: DISCONTINUED | OUTPATIENT
Start: 2023-02-05 | End: 2023-02-06

## 2023-02-05 RX ORDER — SODIUM CHLORIDE, SODIUM LACTATE, POTASSIUM CHLORIDE, CALCIUM CHLORIDE 600; 310; 30; 20 MG/100ML; MG/100ML; MG/100ML; MG/100ML
125 INJECTION, SOLUTION INTRAVENOUS CONTINUOUS
Status: DISCONTINUED | OUTPATIENT
Start: 2023-02-05 | End: 2023-02-06

## 2023-02-05 RX ORDER — LIDOCAINE HYDROCHLORIDE 10 MG/ML
20 INJECTION INFILTRATION; PERINEURAL ONCE
Status: ACTIVE | OUTPATIENT
Start: 2023-02-05 | End: 2023-02-06

## 2023-02-05 RX ORDER — OXYTOCIN/RINGER'S LACTATE 30/500 ML
0-25 PLASTIC BAG, INJECTION (ML) INTRAVENOUS
Status: DISCONTINUED | OUTPATIENT
Start: 2023-02-06 | End: 2023-02-06

## 2023-02-05 RX ORDER — MAG HYDROX/ALUMINUM HYD/SIMETH 200-200-20
30 SUSPENSION, ORAL (FINAL DOSE FORM) ORAL
Status: DISCONTINUED | OUTPATIENT
Start: 2023-02-05 | End: 2023-02-06

## 2023-02-05 RX ORDER — OXYTOCIN/RINGER'S LACTATE 30/500 ML
10 PLASTIC BAG, INJECTION (ML) INTRAVENOUS AS NEEDED
Status: DISCONTINUED | OUTPATIENT
Start: 2023-02-05 | End: 2023-02-06

## 2023-02-05 NOTE — PROGRESS NOTES
[place] [address] Dr Marixa Silva at the bedside assessing patient and getting consent for cook catheter. Patient agree and is placed in proper position. Cook balloon inflated to 60/60 patient tolerated well. RN received orders to monitor baby for 1 hour and allow patient to eat after. 1900 Bedside and Verbal shift change report given to Victoriano Leo RN (oncoming nurse) by Hemant Catalan RN (offgoing nurse). Report included the following information SBAR, Kardex, and MAR.

## 2023-02-05 NOTE — H&P
History & Physical    Name: Axel Mcdowell MRN: [id number]  SSN: [id number]    YOB: 1995  Age: 24 y.o. Sex: female        Subjective:     Estimated Date of Delivery: 23  OB History          3    Para        Term                AB   2    Living             SAB        IAB   2    Ectopic        Molar        Multiple        Live Births                    Ms. Shanthi Morales is admitted with pregnancy at 40w0d for induction of labor. Prenatal course was normal. Her induction is elective. Group B Strep was negative. No past medical history on file. Past Surgical History:   Procedure Laterality Date    HX APPENDECTOMY  2018     Social History     Occupational History    Not on file   Tobacco Use    Smoking status: Never    Smokeless tobacco: Never   Vaping Use    Vaping Use: Never used   Substance and Sexual Activity    Alcohol use: Not Currently    Drug use: Never    Sexual activity: Yes     Partners: Male     Birth control/protection: None     Family History   Problem Relation Age of Onset    Hypertension Mother     Asthma Father     Hypertension Maternal Grandfather        No Known Allergies  Prior to Admission medications    Medication Sig Start Date End Date Taking? Authorizing Provider   prenatal KBIN20/YJLO fum/folic (prenatal vitamin) 27 mg iron- 800 mcg tab tablet TAKE 1 TABLET BY MOUTH IN THE MORNING 22  Yes Provider, Historical   terconazole (TERAZOL 3) 0.8 % vaginal cream Insert 1 Applicator into vagina nightly. Patient not taking: Reported on 2023   Otoniel Shea MD        Review of Systems: A comprehensive review of systems was negative except for that written in the HPI. Objective:     Vitals:  Vitals:    23 1647   BP: 119/68   Pulse: 100   Resp: 16   Temp: 98 °F (36.7 °C)   Weight: 240 lb (108.9 kg)   Height: 5' (1.524 m)        Physical Exam:  Patient without distress.   Heart: Regular rate and rhythm  Lung: normal respiratory effort  Abdomen: soft, nontender  Fundus: soft and non tender  Perineum: blood absent, amniotic fluid absent  Cervical Exam: Closed/Thick/High  Lower Extremities:  - Edema No  Membranes:  Intact  Fetal Heart Rate: Reactive  Baseline: 130's per minute  Variability: moderate  Accelerations: yes  Decelerations: none  Uterine contractions: none    Prenatal Labs:   No results found for: RUBELLAEXT, GRBSEXT, HBSAGEXT, HIVEXT, RPREXT, GONNOEXT, CHLAMEXT     Assessment/Plan:   Principal Problem:    Encounter for induction of labor (2/5/2023)    Active Problems:    40 weeks gestation of pregnancy (2/5/2023)    Plan  Cook catheter   Miso 25 at 11 & 3  Pit at [facility] By:  Patricia Ghosh MD     February 5, 2023

## 2023-02-05 NOTE — OP NOTES
Cervical Catheter insertion procedure note    Doc Mcdaniel is a ,  24 y.o. female who presents today far placement of a cervical catheter in the cervix for ripening. Her cervix is unfavorable and she is scheduled for induction tomorrow. She has elected to have a cervical catheter placement today. The risks, benefits and assets of the procedure were discussed. Her questions were answered. PROCEDURE: The vagina and cervix was prepped with Zephiran. A Cook catheter was placed through the cervix without difficulty. The uterine bulb was inflated with 60 cc of saline. The cervical balloon was inflated to 60 cc of saline. Bleeding was minimal. The patient's level of discomfort was minimal.   POST PROCEDURE: The patient tolerated the procedure well. There were no complications. She was given labor and ROM warnings.

## 2023-02-06 ENCOUNTER — ANESTHESIA (OUTPATIENT)
Dept: LABOR AND DELIVERY | Age: 28
DRG: 560 | End: 2023-02-06
Payer: MEDICAID

## 2023-02-06 ENCOUNTER — ANESTHESIA EVENT (OUTPATIENT)
Dept: LABOR AND DELIVERY | Age: 28
DRG: 560 | End: 2023-02-06
Payer: MEDICAID

## 2023-02-06 PROCEDURE — 3E033VJ INTRODUCTION OF OTHER HORMONE INTO PERIPHERAL VEIN, PERCUTANEOUS APPROACH: ICD-10-PCS | Performed by: OBSTETRICS & GYNECOLOGY

## 2023-02-06 PROCEDURE — 74011250636 HC RX REV CODE- 250/636: Performed by: OBSTETRICS & GYNECOLOGY

## 2023-02-06 PROCEDURE — 75410000002 HC LABOR FEE PER 1 HR: Performed by: OBSTETRICS & GYNECOLOGY

## 2023-02-06 PROCEDURE — 0KQM0ZZ REPAIR PERINEUM MUSCLE, OPEN APPROACH: ICD-10-PCS | Performed by: OBSTETRICS & GYNECOLOGY

## 2023-02-06 PROCEDURE — 65270000029 HC RM PRIVATE

## 2023-02-06 PROCEDURE — 74011250637 HC RX REV CODE- 250/637: Performed by: OBSTETRICS & GYNECOLOGY

## 2023-02-06 PROCEDURE — 74011000250 HC RX REV CODE- 250: Performed by: ANESTHESIOLOGY

## 2023-02-06 PROCEDURE — 77030005513 HC CATH URETH FOL11 MDII -B

## 2023-02-06 PROCEDURE — 59400 OBSTETRICAL CARE: CPT | Performed by: OBSTETRICS & GYNECOLOGY

## 2023-02-06 PROCEDURE — 0UQMXZZ REPAIR VULVA, EXTERNAL APPROACH: ICD-10-PCS | Performed by: OBSTETRICS & GYNECOLOGY

## 2023-02-06 PROCEDURE — 76060000078 HC EPIDURAL ANESTHESIA: Performed by: ANESTHESIOLOGY

## 2023-02-06 PROCEDURE — 77030010507 HC ADH SKN DERMBND J&J -B

## 2023-02-06 PROCEDURE — 2709999900 HC NON-CHARGEABLE SUPPLY

## 2023-02-06 PROCEDURE — 75410000003 HC RECOV DEL/VAG/CSECN EA 0.5 HR: Performed by: OBSTETRICS & GYNECOLOGY

## 2023-02-06 PROCEDURE — 75410000000 HC DELIVERY VAGINAL/SINGLE: Performed by: OBSTETRICS & GYNECOLOGY

## 2023-02-06 RX ORDER — PEPPERMINT OIL
SPIRIT ORAL
Status: DISCONTINUED
Start: 2023-02-06 | End: 2023-02-06

## 2023-02-06 RX ORDER — SIMETHICONE 80 MG
80 TABLET,CHEWABLE ORAL
Status: DISCONTINUED | OUTPATIENT
Start: 2023-02-06 | End: 2023-02-08 | Stop reason: HOSPADM

## 2023-02-06 RX ORDER — HYDROCODONE BITARTRATE AND ACETAMINOPHEN 5; 325 MG/1; MG/1
1 TABLET ORAL
Status: DISCONTINUED | OUTPATIENT
Start: 2023-02-06 | End: 2023-02-08 | Stop reason: HOSPADM

## 2023-02-06 RX ORDER — OXYTOCIN/RINGER'S LACTATE 30/500 ML
0-25 PLASTIC BAG, INJECTION (ML) INTRAVENOUS
Status: DISCONTINUED | OUTPATIENT
Start: 2023-02-06 | End: 2023-02-06

## 2023-02-06 RX ORDER — IBUPROFEN 800 MG/1
800 TABLET ORAL EVERY 8 HOURS
Status: DISCONTINUED | OUTPATIENT
Start: 2023-02-06 | End: 2023-02-06

## 2023-02-06 RX ORDER — OXYTOCIN/RINGER'S LACTATE 30/500 ML
87.3 PLASTIC BAG, INJECTION (ML) INTRAVENOUS AS NEEDED
Status: DISCONTINUED | OUTPATIENT
Start: 2023-02-06 | End: 2023-02-08 | Stop reason: HOSPADM

## 2023-02-06 RX ORDER — DIPHENHYDRAMINE HCL 25 MG
25 CAPSULE ORAL
Status: DISCONTINUED | OUTPATIENT
Start: 2023-02-06 | End: 2023-02-08 | Stop reason: HOSPADM

## 2023-02-06 RX ORDER — LIDOCAINE HYDROCHLORIDE 10 MG/ML
INJECTION INFILTRATION; PERINEURAL
Status: DISCONTINUED
Start: 2023-02-06 | End: 2023-02-06

## 2023-02-06 RX ORDER — IBUPROFEN 800 MG/1
800 TABLET ORAL EVERY 8 HOURS
Status: DISCONTINUED | OUTPATIENT
Start: 2023-02-06 | End: 2023-02-08 | Stop reason: HOSPADM

## 2023-02-06 RX ORDER — ONDANSETRON 4 MG/1
4 TABLET, ORALLY DISINTEGRATING ORAL
Status: ACTIVE | OUTPATIENT
Start: 2023-02-06 | End: 2023-02-07

## 2023-02-06 RX ORDER — DOCUSATE SODIUM 100 MG/1
100 CAPSULE, LIQUID FILLED ORAL 2 TIMES DAILY
Status: DISCONTINUED | OUTPATIENT
Start: 2023-02-06 | End: 2023-02-08 | Stop reason: HOSPADM

## 2023-02-06 RX ORDER — NALOXONE HYDROCHLORIDE 0.4 MG/ML
0.4 INJECTION, SOLUTION INTRAMUSCULAR; INTRAVENOUS; SUBCUTANEOUS AS NEEDED
Status: DISCONTINUED | OUTPATIENT
Start: 2023-02-06 | End: 2023-02-08 | Stop reason: HOSPADM

## 2023-02-06 RX ORDER — OXYTOCIN/RINGER'S LACTATE 30/500 ML
10 PLASTIC BAG, INJECTION (ML) INTRAVENOUS AS NEEDED
Status: DISCONTINUED | OUTPATIENT
Start: 2023-02-06 | End: 2023-02-08 | Stop reason: HOSPADM

## 2023-02-06 RX ORDER — FENTANYL/BUPIVACAINE/NS/PF 2-1250MCG
1-16 PREFILLED PUMP RESERVOIR EPIDURAL CONTINUOUS
Status: DISCONTINUED | OUTPATIENT
Start: 2023-02-06 | End: 2023-02-06

## 2023-02-06 RX ORDER — ZOLPIDEM TARTRATE 5 MG/1
5 TABLET ORAL
Status: DISCONTINUED | OUTPATIENT
Start: 2023-02-06 | End: 2023-02-08 | Stop reason: HOSPADM

## 2023-02-06 RX ORDER — NALOXONE HYDROCHLORIDE 0.4 MG/ML
INJECTION, SOLUTION INTRAMUSCULAR; INTRAVENOUS; SUBCUTANEOUS
Status: DISCONTINUED
Start: 2023-02-06 | End: 2023-02-06

## 2023-02-06 RX ORDER — EPHEDRINE SULFATE/0.9% NACL/PF 50 MG/5 ML
10 SYRINGE (ML) INTRAVENOUS
Status: DISCONTINUED | OUTPATIENT
Start: 2023-02-06 | End: 2023-02-06

## 2023-02-06 RX ADMIN — SODIUM CHLORIDE, POTASSIUM CHLORIDE, SODIUM LACTATE AND CALCIUM CHLORIDE 125 ML/HR: 600; 310; 30; 20 INJECTION, SOLUTION INTRAVENOUS at 02:16

## 2023-02-06 RX ADMIN — SODIUM CHLORIDE, POTASSIUM CHLORIDE, SODIUM LACTATE AND CALCIUM CHLORIDE 999 ML/HR: 600; 310; 30; 20 INJECTION, SOLUTION INTRAVENOUS at 09:15

## 2023-02-06 RX ADMIN — OXYTOCIN 87.3 MILLI-UNITS/MIN: 10 INJECTION INTRAVENOUS at 15:29

## 2023-02-06 RX ADMIN — OXYTOCIN 2 MILLI-UNITS/MIN: 10 INJECTION INTRAVENOUS at 02:13

## 2023-02-06 RX ADMIN — SODIUM CHLORIDE, POTASSIUM CHLORIDE, SODIUM LACTATE AND CALCIUM CHLORIDE 125 ML/HR: 600; 310; 30; 20 INJECTION, SOLUTION INTRAVENOUS at 11:46

## 2023-02-06 RX ADMIN — SODIUM CHLORIDE, POTASSIUM CHLORIDE, SODIUM LACTATE AND CALCIUM CHLORIDE 999 ML/HR: 600; 310; 30; 20 INJECTION, SOLUTION INTRAVENOUS at 06:24

## 2023-02-06 RX ADMIN — IBUPROFEN 800 MG: 800 TABLET, FILM COATED ORAL at 20:16

## 2023-02-06 RX ADMIN — Medication 10 ML/HR: at 10:07

## 2023-02-06 NOTE — L&D DELIVERY NOTE
Delivery Summary    Patient: Ivon Moss MRN: [id number]  SSN: [id number]    YOB: 1995  Age: 24 y.o. Sex: female        over 2nd degree laceration, large vaginal laceration extending deep into vagina on right side - repaired with 3-0 monocryl. Periurethral tear repaired with 4-0 monocryl. Bladder drained - urethra intact. Placenta spontaneous        Information for the patient's :  Christina Mc, Male Silas Apley [[id number]]     Labor Events:    Labor: No    Steroids: None   Cervical Ripening Date/Time: 2023 3:22 PM   Cervical Ripening Type: Fortune/EASI   Antibiotics During Labor: No   Rupture Identifier:      Rupture Date/Time:       Rupture Type: SROM   Amniotic Fluid Volume: Polyhydramic    Amniotic Fluid Description: Clear    Amniotic Fluid Odor:      Induction: Oxytocin       Induction Date/Time:        Indications for Induction: Other(comment)    Augmentation:     Augmentation Date/Time:      Indications for Augmentation:     Labor complications: Anesthetic Complications       Additional complications:        Delivery Events:  Indications For Episiotomy:     Episiotomy: None   Perineal Laceration(s): 2nd   Repaired: Yes   Periurethral Laceration Location: left    Repaired: Yes   Labial Laceration Location:     Repaired:     Sulcal Laceration Location:     Repaired:     Vaginal Laceration Location: right   Repaired: Yes   Cervical Laceration Location:     Repaired:     Repair Suture: Monocryl 3-0; Other (See Note)   Number of Repair Packets: 2   Estimated Blood Loss (ml):  ml   Quantitative Blood Loss (ml)                Delivery Date: 2023    Delivery Time: 3:24 PM  Delivery Type: Vaginal, Spontaneous  Sex:  Male    Gestational Age: 44w3d   Delivery Clinician:  Carlos Alberto Akbar  Living Status: Living   Delivery Location: L&D 207          APGARS  One minute Five minutes Ten minutes   Skin color: 1   1        Heart rate: 2   2        Grimace: 2   2        Muscle tone: 2   2        Breathin   2        Totals: 9   9            Presentation: Vertex    Position:        Resuscitation Method:  Suctioning-bulb; Tactile Stimulation     Meconium Stained: None      Cord Information: 3 Vessels  Complications: None  Cord around:    Delayed cord clamping? Yes  Cord clamped date/time:2023  3:25 PM  Disposition of Cord Blood: Discard    Blood Gases Sent?: No    Placenta:  Date/Time: 2023  3:39 PM  Removal: Expressed      Appearance: Normal      Measurements:  Birth Weight: 8 lb 4 oz (3.742 kg)      Birth Length: 1' 8.5\" (0.521 m)      Head Circumference: 1' 2.57\" (0.37 m)      Chest Circumference: 1' 1.78\" (0.35 m)     Abdominal Girth: 1' 1.19\" (0.335 m)    Other Providers:   CARIDAD GIBBS;SHANTE NORWOOD;CELESTE LOBO;ARAVIND DURAN;SHERYL MARTINEZ;DEONTE EL;SHAYE SOTO, Obstetrician;Primary Nurse;Primary  Nurse;Charge Nurse;Scrub Tech;Primary Nurse;Scrub Tech         Group B Strep:   Lab Results   Component Value Date/Time    GrGIRISHtrep, External Negative 2023 12:00 AM     Information for the patient's :  Misbah Nails, Male Kenyatta Head [[id number]]   No results found for: ABORH, PCTABR, PCTDIG, BILI, ABORHEXT, ABORH   No results for input(s): PCO2CB, PO2CB, HCO3I, SO2I, IBD, PTEMPI, SPECTI, PHICB, ISITE, IDEV, IALLEN in the last 72 hours.

## 2023-02-06 NOTE — PROGRESS NOTES
[address]. from Brigida Morales RN. Assumed care of patient. 9950 This RN continuing to adjust EFM monitor d/t frequent maternal repositioning and fetal movement. 0545 Pitocin reduced and bolus of LR due to EFM tracing.     0557 Pitocin off due to contraction pattern. Gentle traction on FB: FB remains in place. 0600 FB deflated and removed. [address] [phone] 9921 Discussed need for pain control. Pt states she is ready for epidural. See MAR for LR bolus timing.     0607 Pt states \"something is coming out\". Checked pad, moderate amount of bright white fluid noted. 9842 Pt consents to SVE by Brigida Morales RN. 1/50/-3    8837 Dr. Megan Maya notified of patient's request for epidural.    8234 Dr. Megan Maya at bedside    0037 Start time for epidural.    Arun Aris Maya unable to place epidural after multiple attempts. Will call in day shift provider. 9274 Pt states she needs time to compose herself, ambulatory to restroom. [address] at bedside for epidural placement. CRNA will return when pt is ready. Bedside SBAR to Allison Bernardo RN.

## 2023-02-06 NOTE — PROGRESS NOTES
1900-Bedside and Verbal shift change report given to SUMI Croft RN (oncoming nurse) by SARAH Solitario RN (offgoing nurse). Report included the following information SBAR, Procedure Summary, MAR, and Recent Results. 2000-DrLula Nyhan stating patient is closed and vertex per his exam when placing cook catheter. 0429-Bedside and Verbal shift change report given to ROBERTO Luz RN (oncoming nurse) by Emily Hartman RN (offgoing nurse). Report included the following information SBAR, MAR, and Recent Results.

## 2023-02-06 NOTE — PROGRESS NOTES
0710: SBAR report received from Hailey Cantu RN, assumed car of patient at this time. Patient sitting up for epidural.  0735: Fetal heart tones on patient. [id number] Tracy MD at bedside for additional assistance with epidural placement  0818: Lashanda Lee MD discussing plan for patient at bedside with  service. [id number] Thornton MD at bedside for placement,  time out now. 0930: Patient laying down post epidural placement. 6616: Patient nauseated, dry heaving, SROM now. 7761: Patient rotated to lateral left  0951: SVE 5/6/100/0 additional SROM now from MoniqueRUST.   4255: Lay Bautista MD called to bedside for assessment. 8197Vashti Smith MD at bedside, internal monitors placed now. SVE 7cm. 1030: soni placed, patient rotated to lateral right with peanut ball. 1145: See EFM for interventions and labor progression. 1420: Lay Bautista MD at bedside for evaluation. 1440: Patient elevated BP, preparing to push, uncomfortable with pressure. Lay Bautista MD aware. 1452: Patient called out with more pressure -  SVE 10/100/+2, begin pushing now. 1455: Patient begins pushing now, this RN remains at bedside for duration. 1524:  viable male infant. 1600: repair complete. 1815: Transfer to MIU, SBAR given to Brock.

## 2023-02-06 NOTE — PROGRESS NOTES
Labor Progress Note  Patient seen, fetal heart rate and contraction pattern evaluated, patient examined. Feeling lots of pressue  Visit Vitals  /74   Pulse 89   Temp 97.9 °F (36.6 °C)   Resp 18   Ht 5' (1.524 m)   Wt 240 lb (108.9 kg)   LMP 05/01/2022   SpO2 100%   BMI 46.87 kg/m²       Physical Exam:  Cervical Exam:  8/100 %/+1/Posterior  Membranes:  Artificial Rupture of Membranes;  Amniotic Fluid: medium amount of clear fluid  Uterine Activity: Frequency: Every 2-3 minutes  Fetal Heart Rate: Reactive    Assessment/Plan:  Reassuring fetal status, Continue plan for vaginal delivery

## 2023-02-06 NOTE — ANESTHESIA PREPROCEDURE EVALUATION
Relevant Problems   No relevant active problems       Anesthetic History   No history of anesthetic complications            Review of Systems / Medical History  Patient summary reviewed and pertinent labs reviewed    Pulmonary  Within defined limits                 Neuro/Psych   Within defined limits           Cardiovascular  Within defined limits                Exercise tolerance: >4 METS     GI/Hepatic/Renal                Endo/Other        Morbid obesity     Other Findings   Comments: IUP  In Labor           Physical Exam    Airway  Mallampati: II  TM Distance: 4 - 6 cm  Neck ROM: normal range of motion        Cardiovascular  Regular rate and rhythm,  S1 and S2 normal,  no murmur, click, rub, or gallop             Dental  No notable dental hx       Pulmonary  Breath sounds clear to auscultation               Abdominal         Other Findings            Anesthetic Plan    ASA: 2  Anesthesia type: epidural            Anesthetic plan and risks discussed with: Patient       used

## 2023-02-06 NOTE — ADT AUTH CERT NOTES
Comments  Comment          Patient Demographics    Patient Name   Kay Burnett   [id number] Legal Sex   Female    1995 Address   [address]   [address] [facility] [address] Phone   [phone] (Home)     CSN:   [id number]     [address] [place] [address] Date: Admit Time Room Bed   2023  4:13 PM [phone]     Attending Providers    Provider Pager From To   Maria E Engle MD  23   Devi Brito MD  23   Maria E Engle MD  23      Patient Contacts    Name Relation Home Work Mobile   Vlad Other Relative [phone]     H&P Notes     H&P by Devi Brito MD at 23 documented on Admission (Current) from 2023 in [facility] [address] LABOR & DELIVERY    Author: Devi Brito MD Author Type: Physician Filed: 23   Note Status: Signed Cosign: Cosign Not Required Date of Service: 23   : Devi Brito MD (Physician)               History & Physical     Name: Reynaldo rAteaga MRN: [id number]  SSN: [id number]    YOB: 1995  Age: 24 y.o. Sex: female          Subjective:      Estimated Date of Delivery: 23  OB History            3    Para        Term                AB   2    Living               SAB        IAB   2    Ectopic        Molar        Multiple        Live Births                       Ms. Johanny Edouard is admitted with pregnancy at 40w0d for induction of labor. Prenatal course was normal. Her induction is elective. Group B Strep was negative. No past medical history on file.         Past Surgical History:   Procedure Laterality Date    HX APPENDECTOMY   2018      Social History            Occupational History    Not on file   Tobacco Use    Smoking status: Never    Smokeless tobacco: Never   Vaping Use    Vaping Use: Never used   Substance and Sexual Activity    Alcohol use: Not Currently    Drug use: Never    Sexual activity: Yes       Partners: Male Birth control/protection: None            Family History   Problem Relation Age of Onset    Hypertension Mother      Asthma Father      Hypertension Maternal Grandfather           No Known Allergies          Prior to Admission medications    Medication Sig Start Date End Date Taking? Authorizing Provider   prenatal [id number] fum/folic (prenatal vitamin) 27 mg iron- 800 mcg tab tablet TAKE 1 TABLET BY MOUTH IN THE MORNING 11/17/22   Yes Provider, Historical   terconazole (TERAZOL 3) 0.8 % vaginal cream Insert 1 Applicator into vagina nightly. Patient not taking: Reported on 2/5/2023 1/25/23     Richy Tenorio MD         Review of Systems: A comprehensive review of systems was negative except for that written in the HPI. Objective:      Vitals:      Vitals:     02/05/23 1647   BP: 119/68   Pulse: 100   Resp: 16   Temp: 98 °F (36.7 °C)   Weight: 240 lb (108.9 kg)   Height: 5' (1.524 m)         Physical Exam:  Patient without distress.   Heart: Regular rate and rhythm  Lung: normal respiratory effort  Abdomen: soft, nontender  Fundus: soft and non tender  Perineum: blood absent, amniotic fluid absent  Cervical Exam: Closed/Thick/High  Lower Extremities:  - Edema No  Membranes:  Intact  Fetal Heart Rate: Reactive  Baseline: 130's per minute  Variability: moderate  Accelerations: yes  Decelerations: none  Uterine contractions: none     Prenatal Labs:   No results found for: RUBELLAEXT, GRBSEXT, HBSAGEXT, HIVEXT, RPREXT, GONNOEXT, CHLAMEXT      Assessment/Plan:   Principal Problem:    Encounter for induction of labor (2/5/2023)     Active Problems:    40 weeks gestation of pregnancy (2/5/2023)     Plan  Cook catheter   Miso 25 at  & 3  [facility] at [place] By:  Lakshmi Monterroso MD      February 5, 2023      Comments  Comment          To print report, click blue 'Print' hyperlink at right    Report Name Print   Delivery:Mom Chart Print        [facility]          [address] [facility] [address] [facility] [address]  [phone]       Patient: Sonay Bolton  MRN: [id number]  :1995      Rayo Shaper     MRN: [id number]     Link to Baby  Comment        [de-identified] name MRN Account  Age Sex Admission Type    Vickie Adam, Male Omar Moreno [id number] 23 0 days M Confirmed Admission - L&D      OB History       3    Para        Term                AB   2    Living          SAB        IAB   2    Ectopic        Molar        Multiple        Live Births             # Outcome Date GA Labor/2nd Weight Sex Delivery Anes PTL Lv A1 A5   1 IAB                 2 IAB                 3 Current                   Prenatal History    Flowsheet Row Most Recent Value   Did You Receive Prenatal Care Yes   Name Of OB Provider Patric Dwyer   Seen By MFM (Maternal Fetal Medicine)? Yes   Fetal Ultrasound Abnormalities/Concerns?  Yes   Infant Feeding Breast Milk and Formula   Circumcision Planned No   Pediatrician After Birth/ Follow Up Baby Visits On Call     Dating Summary    Working CARINA: 23 set by Martell Ramírez MD on 23 based on Last Menstrual Period on 22     Based On CARINA GA Diff GA User Date   Last Menstrual Period on 22 Working  Martell Ramírez MD 23   Alternate CARINA Entry 23 -2d  Martell Ramírez MD 23     Prenatal Results    Prenatal Labs    Test Value Date Time   ABO/Rh * B Positive  22    Antibody Scrn * Negative  22    Hgb      Hct      Platelets      Rubella * Immune  22    RPR      T. Pallidum Antibody * Non Reactive  22    Urine      Hep B Surf Ag * Negative  22    Hep C * Negative  22    HIV * Negative  22    Gonorrhea      Chlamydia      TSH      GTT, 1 HR (Glucola)      GTT, Fasting      GTT, 1 HR      GTT, 2 HR      GTT, 3 HR        3rd Trimester    Test Value Date Time   Hgb      Hct      Platelets      Group B Strep * Negative  23    Antibody Scrn      TSH      T. Pallidum Antibody Hep B Surf Ag      Gonorrhea      Chlamydia       Screening/Diagnostics    Test Value Date Time   AFP Only      AFP Tetra      Hgb Electrophoresis      Amniocentesis      Cystic Fibrosis      Thalessemia      Ravinder-Sachs      Canavan      PAP Smear      Beta HCG      NT      NIPT      COVID-19        Lab    Test Value Date Time   GTT, Fasting      GTT, 1HR      GTT, 2HR      GTT, 3HR      RPR      Beta HCG      CMV Ab      Toxoplasma Ab      Varicella Zoster Ab         Legend    *: Historical  View all results for this pregnancy     Anat Tejeda Male Tiffany Bailey [[id number]]  [place] Delivery    Birth date/time: 2023 15:24:59  Delivery type: Vaginal, Spontaneous  Complications: Anesthetic Complications  Labor Event Times    Dilation complete date/time: 2023 1456  Start pushing date/time: 2023 1458  Labor Events     labor?: No   steroids?: None  Antibiotics during labor?: No  Rupture type: SROM  Fluid color: Clear  Cervical ripening: Fortune/EASI  Induction: Oxytocin  First cervical ripening date/time: 2023 1522  Induction indications: Other(comment)  Complications: Anesthetic Complications  Delivery Providers    Delivering clinician: Bud Sandifer, MD  Provider Role   Bud Sandifer, MD Obstetrician   Tariq Umaña I Primary Nurse   Malu Avelar RN Primary [place] Nurse   Ethel Modi RN Charge Nurse   Dearl Clifford Cárdenas    Primary Nurse     Anesthesia    Method: Epidural  Multiple Birth Onset Second Stage    Second Stage Start Date: 23 Second Stage Start Time: 8511     Operative Delivery    Forceps attempted?: No  Vacuum extractor attempted?: No  Presentation    Presentation: Vertex  Apgars    Living status: Living  Apgars:  1 min. :  5 min.:  10 min. :  15 min.:  20 min.:    Skin color:         Heart rate:         Reflex irritability:         Muscle tone:         Respiratory effort:          Total:           Resuscitation    Method: Suctioning-bulb, Tactile Stimulation  Shoulder Dystocia    Shoulder dystocia present?: No  Measurements    Weight:   Length:   Lacerations    Episiotomy: None    Lacerations: 2nd  Repair Needed: Monocryl 3-0, Other (See Note)  # of Repair Packets: 2  Suture Type and Size:   Suture Comment:   Estimated Blood Loss (mL):       Placenta    Placenta Date/Time: 2/6/2023 1539  Removal: Expressed  Appearance: Normal Placenta disposition: Discarded     Vaginal Counts    Initial count personnel: Eugene Lemus RNC  Final count personnel: SOULEYMANE NORWOOD RNC,STEPHANIE ANDREW  Skin to Skin    Skin to skin initiated date/time: 2/6/2023 1524  Skin to skin with: Mother  Delivery Summary History    The Delivery Summary has not been signed.

## 2023-02-06 NOTE — ANESTHESIA PROCEDURE NOTES
Epidural Block    Patient location during procedure: OB  Start time: 2/6/2023 9:15 AM  End time: 2/6/2023 9:30 AM  Reason for block: labor epidural  Staffing  Performed: attending   Anesthesiologist: Veronica Bullock MD  Preanesthetic Checklist  Completed: patient identified, IV checked, site marked, risks and benefits discussed, surgical consent, monitors and equipment checked, pre-op evaluation, timeout performed and fire risk safety assessment completed and verbalized  Block Placement  Patient position: sitting  Prep: Betadine  Sterility prep: mask, gown and gloves  Sedation level: no sedation  Patient monitoring: heart rate and continuous pulse oximetry  Approach: midline  Location: lumbar  Lumbar location: L3-L4  Epidural  Loss of resistance technique: air  Guidance: landmark technique  Needle  Needle type: Tuohy   Needle gauge: 17 G  Needle length: 9 cm  Needle insertion depth: 8 cm  Catheter type: multi-orifice  Catheter size: 21 G  Catheter at skin depth: 12.5 cm  Catheter securement method: clear occlusive dressing  Test dose: negative  Assessment  Number of attempts: 1  Additional Notes  Attempts by 3 previous providers unsuccessful

## 2023-02-07 PROCEDURE — 74011250637 HC RX REV CODE- 250/637: Performed by: OBSTETRICS & GYNECOLOGY

## 2023-02-07 PROCEDURE — 65270000029 HC RM PRIVATE

## 2023-02-07 RX ADMIN — IBUPROFEN 800 MG: 800 TABLET, FILM COATED ORAL at 03:46

## 2023-02-07 RX ADMIN — DOCUSATE SODIUM 100 MG: 100 CAPSULE, LIQUID FILLED ORAL at 12:19

## 2023-02-07 RX ADMIN — IBUPROFEN 800 MG: 800 TABLET, FILM COATED ORAL at 12:19

## 2023-02-07 RX ADMIN — IBUPROFEN 800 MG: 800 TABLET, FILM COATED ORAL at 19:31

## 2023-02-07 RX ADMIN — HYDROCODONE BITARTRATE AND ACETAMINOPHEN 1 TABLET: 5; 325 TABLET ORAL at 03:47

## 2023-02-07 NOTE — LACTATION NOTE
This note was copied from a baby's chart. Mother has been formula feeding baby. She last fed baby at 1345 (25 ml of formula taken in a bottle). Mother wanted to pump. Symphony pump set up and instructions for use given. Mother taught hand expression - drops of colostrum expressed and finger fed to baby.  offered to assist mother putting baby to breast but she declined. Discussed with mother her plan for feeding. Reviewed the benefits of exclusive breast milk feeding during the hospital stay. Informed her of the risks of using formula to supplement in the first few days of life as well as the benefits of successful breast milk feeding; referred her to the Breastfeeding booklet about this information. She acknowledges understanding of information reviewed and states that it is her plan to give breast milk in a bottle/formula feed her infant. Will support her choice and offer additional information as needed. Encouraged mom to attempt feeding with baby led feeding cues. Just as sucking on fingers, rooting, mouthing. Looking for 8-12 feedings in 24 hours. Don't limit baby at breast, allow baby to come of breast on it's own. Baby may want to feed  often and may increase number of feedings on second day of life. Skin to skin encouraged. If baby doesn't nurse,  Mom should  hand express  10-20 drops of colostrum and drip into baby's mouth, or give to baby by finger feeding, cup feeding, or spoon feeding at least every 2-3 hours. Pumping:  Guidelines for pumping, milk collection and storage, proper cleaning of pump parts all reviewed. How to establish and maintain breast milk supply through pumping reviewed. Differences between hospital grade rental pumps vs store bought double electric/hand pumps discussed. Set up pumping with double electric set up. Assisted with pump session. List of area pump rental locations and lactation support services provided.      Discussed eating a healthy diet. Instructed mother to eat a variety of foods in order to get a well balanced diet. She should consume an extra 500 calories per day (more than her non-pregnant requirement.) These extra calories will help provide energy needed for optimal breast milk production. Mother also encouraged to \"drink to thirst\" and it is recommended that she drink fluids such as water, fruit/vegetable juice. Nutritious snacks should be available so that she can eat throughout the day to help satisfy her hunger and maintain a good milk supply. Engorgement Care Guidelines:  Reviewed how milk is made and normal phases of milk production. Taught care of engorged breasts - physiologic, cool packs (20 min on/20min off before and after pumping- no ice directly on skin) and motrin as tolerated. Anticipatory guidance shared. Breast Assessment  Left Breast: Medium  Left Nipple: Everted, Intact  Right Breast: Medium  Right Nipple: Everted, Intact  Breast- Feeding Assessment  Attends Breast-Feeding Classes: No  Breast-Feeding Experience: No  Breast Trauma/Surgery: No  Type/Quality:  (Mother has been formula feeding.)  Lactation Consultant Visits  Breast-Feedings: Not breast-feeding (Mother last fed baby at [phone] (25 ml of formula). Mother wanted to try and pump - [employer] pump set up/instructions for use given. (she pumped for 20 minutes-drops of colostrum finger fed to baby.))       Breastfeeding handouts given in [facility]

## 2023-02-07 NOTE — ROUTINE PROCESS
1845:Pt. became very dizzy upon ambulating to  bathroom, complaining of ringing in the ears and feeling very bad. Patient assisted back to bed.

## 2023-02-07 NOTE — PROGRESS NOTES
Post-Partum Day Number 1 Progress Note    [facility]       Information for the patient's :  Shanthi Morales, Male Miguelina Pop [[id number]]   Vaginal, Spontaneous  Patient doing well without significant complaint. Voiding without difficulty, normal lochia. Vitals:  Visit Vitals  BP 96/60 (BP 1 Location: Left upper arm, BP Patient Position: At rest)   Pulse 97   Temp 98.4 °F (36.9 °C)   Resp 16   Ht 5' (1.524 m)   Wt 240 lb (108.9 kg)   LMP 2022   SpO2 97%   Breastfeeding Unknown   BMI 46.87 kg/m²     Temp (24hrs), Av.3 °F (36.8 °C), Min:98 °F (36.7 °C), Max:98.6 °F (37 °C)        Exam:   Patient without distress. Abdomen soft, fundus firm, nontender                Perineum with normal lochia noted. Lower extremities are negative for swelling, cords or tenderness. Labs:     Lab Results   Component Value Date/Time    WBC 10.7 2023 04:35 PM    WBC 15.0 (H) 2022 04:30 PM    WBC 11.7 (H) 2022 03:28 PM    WBC 9.5 2022 05:29 PM    HGB 12.1 2023 04:35 PM    HGB 11.6 2022 04:30 PM    HGB 12.0 2022 03:28 PM    HGB 12.9 2022 05:29 PM    HCT 34.7 (L) 2023 04:35 PM    HCT 35.2 2022 04:30 PM    HCT 36.2 2022 03:28 PM    HCT 37.5 2022 05:29 PM    PLATELET 856  04:35 PM    PLATELET 504  04:30 PM    PLATELET 554  03:28 PM    PLATELET 576  05:29 PM       No results found for this or any previous visit (from the past 24 hour(s)). Assessment: Doing well, post partum day 1    Plan:  1. Continue routine postpartum and perineal care as well as maternal education.

## 2023-02-08 VITALS
RESPIRATION RATE: 18 BRPM | HEART RATE: 73 BPM | BODY MASS INDEX: 47.12 KG/M2 | SYSTOLIC BLOOD PRESSURE: 103 MMHG | WEIGHT: 240 LBS | HEIGHT: 60 IN | DIASTOLIC BLOOD PRESSURE: 67 MMHG | OXYGEN SATURATION: 97 % | TEMPERATURE: 97.4 F

## 2023-02-08 PROCEDURE — 74011250637 HC RX REV CODE- 250/637: Performed by: OBSTETRICS & GYNECOLOGY

## 2023-02-08 RX ORDER — IBUPROFEN 800 MG/1
800 TABLET ORAL
Qty: 60 TABLET | Refills: 0 | Status: SHIPPED | OUTPATIENT
Start: 2023-02-08

## 2023-02-08 RX ADMIN — IBUPROFEN 800 MG: 800 TABLET, FILM COATED ORAL at 02:44

## 2023-02-08 NOTE — ROUTINE PROCESS
Discharge instructions given to patient at bedside via Maira Robbins, Certified , including but not limited to signs and symptoms and who to call; restrictions and limitations; postpartum depression. All questions answered. Follow up appointment reviewed with patient. Discharge supplies given to patient. Signature pad not working in room. Hard signed copy placed in chart. Prescriptions sent to patient's pharmacy.

## 2023-02-08 NOTE — DISCHARGE SUMMARY
Obstetrical Discharge Summary     Name: Shruti Booker MRN: [id number]  SSN: [id number]    YOB: 1995  Age: 24 y.o. Sex: female      Admit Date: 2023    Discharge Date: 2023     Admitting Physician: Jamaica Willams MD     Attending Physician:  Gracy De Santiago MD     Admission Diagnoses: Encounter for induction of labor [Z34.90]; Normal labor and delivery [O80]    Discharge Diagnoses:   Information for the patient's :  Compa Arcos, Male Tahmina Ramos [[id number]]   Delivery of a 8 lb 4 oz (3.742 kg) male infant via Vaginal, Spontaneous on 2023 at 3:24 PM  by Jamaica Willams. Apgars were 9  and 9 . Additional Diagnoses:   Hospital Problems  Date Reviewed: 2023            Codes Class Noted POA    * (Principal) Encounter for induction of labor ICD-10-CM: Z34.90  ICD-9-CM: V22.1  2023 Yes        40 weeks gestation of pregnancy ICD-10-CM: Z3A.40  ICD-9-CM: V22.2  2023 Yes        Normal labor and delivery ICD-10-CM: O80  ICD-9-CM: 858  2023 Unknown          Lab Results   Component Value Date/Time    Rubella, External Immune 2022 12:00 AM    GrBStrep, External Negative 2023 12:00 AM       Hospital Course: Normal hospital course following the delivery. Disposition: Home  Condition: Good    Patient Instructions:   Current Discharge Medication List        START taking these medications    Details   ibuprofen (MOTRIN) 800 mg tablet Take 1 Tablet by mouth every eight (8) hours as needed for Pain. Qty: 60 Tablet, Refills: 0           CONTINUE these medications which have NOT CHANGED    Details   prenatal WIVI93/LKMZ fum/folic (prenatal vitamin) 27 mg iron- 800 mcg tab tablet TAKE 1 TABLET BY MOUTH IN THE MORNING      terconazole (TERAZOL 3) 0.8 % vaginal cream Insert 1 Applicator into vagina nightly. Qty: 20 g, Refills: 0             Reference my discharge instructions.     Follow-up Appointments   Procedures    FOLLOW UP VISIT Appointment in: 6 Weeks     Standing Status:   Standing     Number of Occurrences:   1     Order Specific Question:   Appointment in     Answer:   6 Weeks        Signed By:  Rose Wheeler MD     February 8, 2023

## 2023-02-08 NOTE — DISCHARGE INSTRUCTIONS
POST DELIVERY DISCHARGE INSTRUCTIONS    Name: Mecca Cabral  YOB: 1995  Primary Diagnosis: Principal Problem:    Encounter for induction of labor (2023)    Active Problems:    40 weeks gestation of pregnancy (2023)      Normal labor and delivery (2023)        General:     Diet/Diet Restrictions:  Eight 8-ounce glasses of fluid daily (water, juices); avoid excessive caffeine intake. Meals/snacks as desired which are high in fiber and carbohydrates and low in fat and cholesterol. Medications:   Current Discharge Medication List        START taking these medications    Details   ibuprofen (MOTRIN) 800 mg tablet Take 1 Tablet by mouth every eight (8) hours as needed for Pain. Qty: 60 Tablet, Refills: 0           CONTINUE these medications which have NOT CHANGED    Details   prenatal LUCP92/KRLJ fum/folic (prenatal vitamin) 27 mg iron- 800 mcg tab tablet TAKE 1 TABLET BY MOUTH IN THE MORNING      terconazole (TERAZOL 3) 0.8 % vaginal cream Insert 1 Applicator into vagina nightly. Qty: 20 g, Refills: 0             Physical Activity / Restrictions / Safety:     Avoid heavy lifting, no more that 8 lbs. For 2-3 weeks; No driving while taking narcotic pain medication. Post  patients should not drive until pain free. No intercourse 4-6 weeks, no douching or tampon use. May resume exercise in 6 weeks. Discharge Instructions/Special Treatment/Home Care Needs:     Continue prenatal vitamins. Continue to use squirt bottle with warm water on your episiotomy after each bathroom use until bleeding stops. If steri-strips applied to your incision, remove in 7 days. Take stool softeners daily. Call your doctor for the following:     Fever over 101 degrees by mouth. Vaginal bleeding heavier than a normal menstrual period or lost larger than a golf ball.   Red streaks or increased swelling of legs, painful red streaks on your breast.  Painful urination, or increased pain, redness or discharge with your incision. Pain Management:     Pain Management:   Take Acetaminophen (Tylenol) or Ibuprofen (Advil, Motrin), as directed for pain. Use a warm Sitz bath 3 times daily to relieve episiotomy or hemorrhoidal discomfort. Heating pad to  incision as needed. For hemorrhoidal discomfort, use Tucks and Anusol cream as needed and directed.     Follow-Up Care:     Appointment with MD:   Follow-up Appointments   Procedures    FOLLOW UP VISIT Appointment in: 6 Weeks     Standing Status:   Standing     Number of Occurrences:   1     Order Specific Question:   Appointment in     Answer:   Noy Murray     Telephone number: [phone]    Signed By: Howard Anand MD                                                                                                   Date: 2023 Time: 9:25 AM

## 2023-02-08 NOTE — LACTATION NOTE
This note was copied from a baby's chart. Mother and baby for discharge. Mother is pumping with symphony breast pump Q 2-3 hours and is currently getting drops of colostrum. She has been formula feeding her baby. Reviewed storage and preparation of expressed breast milk for baby. Engorgement Care Guidelines:  Reviewed how milk is made and normal phases of milk production. Taught care of engorged breasts - pumping Q 2-3 hours encouraged, cool packs - no ice directly on skin) and motrin as tolerated. Anticipatory guidance shared. Breast Assessment  Left Breast: Medium  Left Nipple: Everted, Intact  Right Breast: Medium  Right Nipple: Everted, Intact  Breast- Feeding Assessment  Attends Breast-Feeding Classes: No  Breast-Feeding Experience: No  Breast Trauma/Surgery: No  Type/Quality:  (Mother is pumping and formula feeding  her baby.)  Lactation Consultant Visits  Breast-Feedings: Not breast-feeding (Mother/baby for discharge. Mother last formula fed baby at 0600 - baby took 33ml.) Mother is pumping with symphony breast pump Q 2-3  hours and getting drops of colostrum. Mother will continue pumping regimen Q 2-3  hours when home.)    Chart shows numerous feedings, void, stool WNL. Discussed importance of monitoring outputs and feedings on first week of life. Discussed ways to tell if baby is  getting enough breast milk, ie  voids and stools, change in color of stool, and return to birth wt within 2 weeks. Follow up with pediatrician visit for weight check in 1-2 days (per AAP guidelines.) . Mother also given breastfeeding support group dates and times for any future needs    Breastfeeding handouts and 24 hour breastfeeding # given in [address] [facility]

## 2023-03-14 NOTE — PROGRESS NOTES
Mecca Cabral is a 24 y.o. female returns for a routine post-partum follow-up visit     No chief complaint on file. Postpartum Depression: Low Risk     Last EPDS Total Score: 1    Last EPDS Self Harm Result: Never       Type of delivery: normal spontaneous vaginal delivery of male infant  Date of Delivery: 2/6/23  Breastfeeding: {yes/ no default yes:23483:o}  Bleeding Resolved:  {yes/ no default yes:61784:o}  Birth Control: {contraception:262908}. Last Pap: NILM 7/20/22        Problems: {problem:14411}    1. Have you been to the ER, urgent care clinic, or hospitalized since your last visit? {Yes when where and reason for visit:20441}    2. Have you seen or consulted any other health care providers outside of the [address] [place] [address] since your last visit? {Yes when where and reason for visit:20441}    Examination chaperoned by Nohelia Mendez RN.

## 2023-03-20 ENCOUNTER — OFFICE VISIT (OUTPATIENT)
Dept: OBGYN CLINIC | Age: 28
End: 2023-03-20
Payer: MEDICAID

## 2023-03-20 VITALS
HEIGHT: 60 IN | DIASTOLIC BLOOD PRESSURE: 78 MMHG | BODY MASS INDEX: 44.84 KG/M2 | SYSTOLIC BLOOD PRESSURE: 116 MMHG | WEIGHT: 228.4 LBS

## 2023-03-20 PROCEDURE — 0503F POSTPARTUM CARE VISIT: CPT | Performed by: OBSTETRICS & GYNECOLOGY

## 2023-03-20 NOTE — PROGRESS NOTES
Tj Holt is a 24 y.o. female returns for a routine post-partum follow-up visit     Chief Complaint   Patient presents with    Post-Partum Care         Postpartum Depression: Low Risk     Last EPDS Total Score: 1    Last EPDS Self Harm Result: Never       Type of delivery: normal spontaneous vaginal delivery of male infant  Date of Delivery: 2/6/23  Breastfeeding: no  Bleeding Resolved:  yes currently on menses; first day 3/15/2023  Birth Control: none. Would like to discuss birth control; possibly IUD  Last Pap: NILM 7/20/22        Problems:    Having vaginal discharge and was prescribed a cream, but since she is currently on menses, not clear if this issue has resolved. Yellowish, no odor. History of this during pregnancy. 1. Have you been to the ER, urgent care clinic, or hospitalized since your last visit? No    2. Have you seen or consulted any other health care providers outside of the [address] [place] [address] since your last visit? No    Examination chaperoned by Lindsay Quigley LPN.

## 2023-03-20 NOTE — PROGRESS NOTES
Postpartum evaluation    Teresa Vaca is a 24 y.o. female who presents for a postpartum exam.     She is now six weeks post normal spontaneous vaginal delivery. Last Pap: NILM 7/20/22  Her baby is doing well. She has had no menses since delivery. She has had the following significant problems since her delivery: none    The patient is bottle feeding without difficulty. She is currently taking: no medications. She is due for her next AE in 4 months.      Visit Vitals  /78   Ht 5' (1.524 m)   Wt 228 lb 6.4 oz (103.6 kg)   LMP 03/15/2023 (Exact Date)   Breastfeeding No   BMI 44.61 kg/m²       PHYSICAL EXAMINATION    Constitutional  Appearance: well-nourished, well developed, alert, in no acute distress    HENT  Head and Face: appears normal    Neck  Inspection/Palpation: normal appearance, no masses or tenderness  Lymph Nodes: no lymphadenopathy present  Thyroid: gland size normal, nontender, no nodules or masses present on palpation    Breasts  Inspection of Breasts: breasts symmetrical, no skin changes, no discharge present, nipple appearance normal, no skin retraction present  Palpation of Breasts and Axillae: no masses present on palpation, no breast tenderness  Axillary Lymph Nodes: no lymphadenopathy present    Gastrointestinal  Abdominal Examination: abdomen non-tender to palpation, normal bowel sounds, no masses present  Liver and spleen: no hepatomegaly present, spleen not palpable  Hernias: no hernias identified    Genitourinary  External Genitalia: normal appearance for age, no discharge present, no tenderness present, no inflammatory lesions present, no masses present, no atrophy present  Vagina: normal vaginal vault without central or paravaginal defects, no discharge present, no inflammatory lesions present, no masses present  Bladder: non-tender to palpation  Urethra: appears normal  Cervix: normal   Uterus: normal size, shape and consistency  Adnexa: no adnexal tenderness present, no adnexal masses present  Perineum: perineum within normal limits, no evidence of trauma, no rashes or skin lesions present  Anus: anus within normal limits, no hemorrhoids present  Inguinal Lymph Nodes: no lymphadenopathy present    Skin  General Inspection: no rash, no lesions identified    Neurologic/Psychiatric  Mental Status:  Orientation: grossly oriented to person, place and time  Mood and Affect: mood normal, affect appropriate    Assessment:  Normal postpartum check    Plan:  RTO for AE.   Call with menses for Mirena

## 2024-10-23 ENCOUNTER — HOSPITAL ENCOUNTER (EMERGENCY)
Facility: HOSPITAL | Age: 29
Discharge: HOME | End: 2024-10-23
Attending: EMERGENCY MEDICINE
Payer: MEDICAID

## 2024-10-23 ENCOUNTER — APPOINTMENT (OUTPATIENT)
Facility: HOSPITAL | Age: 29
End: 2024-10-23
Payer: MEDICAID

## 2024-10-23 VITALS
BODY MASS INDEX: 47.4 KG/M2 | DIASTOLIC BLOOD PRESSURE: 70 MMHG | WEIGHT: 242.73 LBS | TEMPERATURE: 98 F | OXYGEN SATURATION: 100 % | HEART RATE: 100 BPM | RESPIRATION RATE: 18 BRPM | SYSTOLIC BLOOD PRESSURE: 106 MMHG

## 2024-10-23 DIAGNOSIS — R07.9 CHEST PAIN, UNSPECIFIED TYPE: ICD-10-CM

## 2024-10-23 DIAGNOSIS — R05.1 ACUTE COUGH: Primary | ICD-10-CM

## 2024-10-23 DIAGNOSIS — O23.41 URINARY TRACT INFECTION IN MOTHER DURING FIRST TRIMESTER OF PREGNANCY: ICD-10-CM

## 2024-10-23 LAB
ALBUMIN SERPL-MCNC: 3.3 G/DL (ref 3.5–5)
ALBUMIN/GLOB SERPL: 0.8 (ref 1.1–2.2)
ALP SERPL-CCNC: 102 U/L (ref 45–117)
ALT SERPL-CCNC: 15 U/L (ref 12–78)
ANION GAP SERPL CALC-SCNC: 6 MMOL/L (ref 2–12)
APPEARANCE UR: CLEAR
AST SERPL-CCNC: 9 U/L (ref 15–37)
BACTERIA URNS QL MICRO: ABNORMAL /HPF
BASOPHILS # BLD: 0.1 K/UL (ref 0–0.1)
BASOPHILS NFR BLD: 1 % (ref 0–1)
BILIRUB SERPL-MCNC: 0.2 MG/DL (ref 0.2–1)
BILIRUB UR QL: NEGATIVE
BUN SERPL-MCNC: 9 MG/DL (ref 6–20)
BUN/CREAT SERPL: 12 (ref 12–20)
CALCIUM SERPL-MCNC: 8.8 MG/DL (ref 8.5–10.1)
CHLORIDE SERPL-SCNC: 108 MMOL/L (ref 97–108)
CO2 SERPL-SCNC: 25 MMOL/L (ref 21–32)
COLOR UR: ABNORMAL
CREAT SERPL-MCNC: 0.73 MG/DL (ref 0.55–1.02)
D DIMER PPP FEU-MCNC: 1.1 MG/L FEU (ref 0–0.65)
DIFFERENTIAL METHOD BLD: ABNORMAL
EOSINOPHIL # BLD: 0.6 K/UL (ref 0–0.4)
EOSINOPHIL NFR BLD: 5 % (ref 0–7)
EPITH CASTS URNS QL MICRO: ABNORMAL /LPF
ERYTHROCYTE [DISTWIDTH] IN BLOOD BY AUTOMATED COUNT: 14 % (ref 11.5–14.5)
FLUAV RNA SPEC QL NAA+PROBE: NOT DETECTED
FLUBV RNA SPEC QL NAA+PROBE: NOT DETECTED
GLOBULIN SER CALC-MCNC: 4 G/DL (ref 2–4)
GLUCOSE SERPL-MCNC: 93 MG/DL (ref 65–100)
GLUCOSE UR STRIP.AUTO-MCNC: NEGATIVE MG/DL
HCT VFR BLD AUTO: 36.5 % (ref 35–47)
HGB BLD-MCNC: 12.4 G/DL (ref 11.5–16)
HGB UR QL STRIP: NEGATIVE
HYALINE CASTS URNS QL MICRO: ABNORMAL /LPF (ref 0–5)
IMM GRANULOCYTES # BLD AUTO: 0.1 K/UL (ref 0–0.04)
IMM GRANULOCYTES NFR BLD AUTO: 1 % (ref 0–0.5)
KETONES UR QL STRIP.AUTO: NEGATIVE MG/DL
LEUKOCYTE ESTERASE UR QL STRIP.AUTO: ABNORMAL
LYMPHOCYTES # BLD: 2.6 K/UL (ref 0.8–3.5)
LYMPHOCYTES NFR BLD: 22 % (ref 12–49)
MCH RBC QN AUTO: 27.7 PG (ref 26–34)
MCHC RBC AUTO-ENTMCNC: 34 G/DL (ref 30–36.5)
MCV RBC AUTO: 81.5 FL (ref 80–99)
MONOCYTES # BLD: 0.6 K/UL (ref 0–1)
MONOCYTES NFR BLD: 5 % (ref 5–13)
NEUTS SEG # BLD: 7.6 K/UL (ref 1.8–8)
NEUTS SEG NFR BLD: 66 % (ref 32–75)
NITRITE UR QL STRIP.AUTO: NEGATIVE
NRBC # BLD: 0 K/UL (ref 0–0.01)
NRBC BLD-RTO: 0 PER 100 WBC
PH UR STRIP: 6 (ref 5–8)
PLATELET # BLD AUTO: 351 K/UL (ref 150–400)
PMV BLD AUTO: 9.7 FL (ref 8.9–12.9)
POTASSIUM SERPL-SCNC: 3.9 MMOL/L (ref 3.5–5.1)
PROT SERPL-MCNC: 7.3 G/DL (ref 6.4–8.2)
PROT UR STRIP-MCNC: NEGATIVE MG/DL
RBC # BLD AUTO: 4.48 M/UL (ref 3.8–5.2)
RBC #/AREA URNS HPF: ABNORMAL /HPF (ref 0–5)
S PYO DNA THROAT QL NAA+PROBE: NOT DETECTED
SARS-COV-2 RNA RESP QL NAA+PROBE: NOT DETECTED
SODIUM SERPL-SCNC: 139 MMOL/L (ref 136–145)
SOURCE: NORMAL
SP GR UR REFRACTOMETRY: 1.01 (ref 1–1.03)
SPECIMEN HOLD: NORMAL
TROPONIN I SERPL HS-MCNC: <4 NG/L (ref 0–51)
UROBILINOGEN UR QL STRIP.AUTO: 0.2 EU/DL (ref 0.2–1)
WBC # BLD AUTO: 11.4 K/UL (ref 3.6–11)
WBC URNS QL MICRO: ABNORMAL /HPF (ref 0–4)

## 2024-10-23 PROCEDURE — 99284 EMERGENCY DEPT VISIT MOD MDM: CPT

## 2024-10-23 PROCEDURE — 85025 COMPLETE CBC W/AUTO DIFF WBC: CPT

## 2024-10-23 PROCEDURE — 87636 SARSCOV2 & INF A&B AMP PRB: CPT

## 2024-10-23 PROCEDURE — 80053 COMPREHEN METABOLIC PANEL: CPT

## 2024-10-23 PROCEDURE — 85379 FIBRIN DEGRADATION QUANT: CPT

## 2024-10-23 PROCEDURE — 36415 COLL VENOUS BLD VENIPUNCTURE: CPT

## 2024-10-23 PROCEDURE — 81001 URINALYSIS AUTO W/SCOPE: CPT

## 2024-10-23 PROCEDURE — 87086 URINE CULTURE/COLONY COUNT: CPT

## 2024-10-23 PROCEDURE — 87651 STREP A DNA AMP PROBE: CPT

## 2024-10-23 PROCEDURE — 84484 ASSAY OF TROPONIN QUANT: CPT

## 2024-10-23 PROCEDURE — 93005 ELECTROCARDIOGRAM TRACING: CPT

## 2024-10-23 RX ORDER — ACETAMINOPHEN 500 MG
1000 TABLET ORAL ONCE
Status: DISCONTINUED | OUTPATIENT
Start: 2024-10-23 | End: 2024-10-24 | Stop reason: HOSPADM

## 2024-10-23 RX ORDER — CEPHALEXIN 500 MG/1
500 CAPSULE ORAL 3 TIMES DAILY
Qty: 12 CAPSULE | Refills: 0 | Status: SHIPPED | OUTPATIENT
Start: 2024-10-23 | End: 2024-10-27

## 2024-10-23 RX ORDER — 0.9 % SODIUM CHLORIDE 0.9 %
1000 INTRAVENOUS SOLUTION INTRAVENOUS ONCE
Status: DISCONTINUED | OUTPATIENT
Start: 2024-10-23 | End: 2024-10-24 | Stop reason: HOSPADM

## 2024-10-23 ASSESSMENT — PAIN SCALES - GENERAL: PAINLEVEL_OUTOF10: 3

## 2024-10-23 ASSESSMENT — PAIN DESCRIPTION - ORIENTATION: ORIENTATION: MID

## 2024-10-23 ASSESSMENT — PAIN - FUNCTIONAL ASSESSMENT
PAIN_FUNCTIONAL_ASSESSMENT: 0-10
PAIN_FUNCTIONAL_ASSESSMENT: ACTIVITIES ARE NOT PREVENTED

## 2024-10-23 ASSESSMENT — PAIN DESCRIPTION - DESCRIPTORS: DESCRIPTORS: ACHING

## 2024-10-23 ASSESSMENT — PAIN DESCRIPTION - LOCATION: LOCATION: HEAD

## 2024-10-23 NOTE — ED PROVIDER NOTES
[facility] EMERGENCY DEP  EMERGENCY DEPARTMENT ENCOUNTER      Date: 10/23/2024  Patient Name: Bessie Mccoy  MRN: [id number]  Birthdate 1995  Date of evaluation: 10/23/2024  Provider: LEANDRA Day NP   Note Started: 7:38 PM EDT 10/23/24    CHIEF COMPLAINT     Chief Complaint   Patient presents with    Cough       HISTORY OF PRESENT ILLNESS  (Onset, Location, Duration, Character, Alleviating/Aggravating, Radiation, Timing, Severity)   Note limiting factors.   History Provided By: Patient, [id number] , and     HPI: Bessie Mccoy is a 28 y.o. female with a history of appendectomy presents with cough, shortness of breath, chest tightness, fatigue, congestion, runny nose, sore throat, body aches over the last 2 weeks.  Currently pregnant.   LMP 2024. . Left eye red and itchy for the last 3 days.  Lump in left side of neck.  Denies fevers, N/V/D, abd pain, vaginal bleeding/discharge, urinary symptoms, rash.       Nursing Notes and triage vitals were reviewed.  PCP: No primary care provider on file.      PAST MEDICAL HISTORY   Past Medical History:  No past medical history on file.    Past Surgical History:  Past Surgical History:   Procedure Laterality Date    APPENDECTOMY         Family History:  Family History   Problem Relation Age of Onset    Hypertension Maternal Grandfather     Hypertension Mother     Asthma Father        Social History:  Social History     Tobacco Use    Smoking status: Never    Smokeless tobacco: Never   Substance Use Topics    Alcohol use: Not Currently    Drug use: Never       Allergies:  No Known Allergies    Current Meds:   Current Facility-Administered Medications   Medication Dose Route Frequency Provider Last Rate Last Admin    sodium chloride 0.9 % bolus 1,000 mL  1,000 mL IntraVENous Once Anila Cartagena APRN - NP   Held at 10/23/24 2230    acetaminophen (TYLENOL) tablet 1,000 mg  1,000 mg Oral Once Anila Cartagena APRN - NP

## 2024-10-23 NOTE — ED TRIAGE NOTES
Patient arrives ambulatory to triage with c/o cold symptoms for 2.5 weeks. She reports cough, back pain, SOB, chest tightness and headaches.   She is currently pregnant, LMP 2024

## 2024-10-24 LAB
BACTERIA SPEC CULT: NORMAL
CC UR VC: NORMAL
SERVICE CMNT-IMP: NORMAL

## 2024-10-24 ASSESSMENT — HEART SCORE: ECG: NORMAL

## 2024-10-24 NOTE — DISCHARGE INSTRUCTIONS
Thank you for allowing us to provide you with medical care today.  We realize that you have many choices for your emergency care needs.  We thank you for choosing [facility].  Please choose us in the future for any continued health care needs.     We hope we addressed all of your medical concerns. We strive to provide excellent quality care in the Emergency Department.  Anything less than excellent does not meet our expectations.     The exam and treatment you received in the Emergency Department were for an emergent problem and are not intended as complete care. It is important that you follow up with a doctor, nurse practitioner, or physician’s assistant for ongoing care. If your symptoms worsen or you do not improve as expected and you are unable to reach your usual health care provider, you should return to the Emergency Department. We are available 24 hours a day.     Take this sheet with you when you go to your follow-up visit.     If you have any problem arranging the follow-up visit, contact the Emergency Department immediately.     Make an appointment your family doctor for follow up of this visit. Return to the ER if you are unable to be seen in a timely manner.     Below is a summary of your results.    Labs  Recent Results (from the past 12 hour(s))   EKG 12 Lead (SOB)    Collection Time: 10/23/24  8:48 PM   Result Value Ref Range    Ventricular Rate 97 BPM    Atrial Rate 97 BPM    P-R Interval 148 ms    QRS Duration 94 ms    Q-T Interval 342 ms    QTc Calculation (Bazett) 434 ms    P Axis 60 degrees    R Axis 33 degrees    T Axis 17 degrees    Diagnosis       Normal sinus rhythm  Cannot rule out Anterior infarct , age undetermined  Abnormal ECG  No previous ECGs available     CBC with Auto Differential    Collection Time: 10/23/24  9:00 PM   Result Value Ref Range    WBC 11.4 (H) 3.6 - 11.0 K/uL    RBC 4.48 3.80 - 5.20 M/uL    Hemoglobin 12.4 11.5 - 16.0 g/dL    Hematocrit 36.5

## 2024-10-25 LAB
EKG ATRIAL RATE: 97 BPM
EKG DIAGNOSIS: NORMAL
EKG P AXIS: 60 DEGREES
EKG P-R INTERVAL: 148 MS
EKG Q-T INTERVAL: 342 MS
EKG QRS DURATION: 94 MS
EKG QTC CALCULATION (BAZETT): 434 MS
EKG R AXIS: 33 DEGREES
EKG T AXIS: 17 DEGREES
EKG VENTRICULAR RATE: 97 BPM

## 2024-10-25 PROCEDURE — 93010 ELECTROCARDIOGRAM REPORT: CPT | Performed by: SPECIALIST

## 2024-11-01 NOTE — PROGRESS NOTES
Bessie Mccoy is a 29 y.o. female presents for a new pregnancy visit.    Chief Complaint   Patient presents with    Initial Prenatal Visit     Problems: Amenorrhea   Patient's last menstrual period was 2024 (exact date).  Last Pap: Normal 2 year(s) ago 22    LMP history:  The date of her LMP is  certain.  Her last menstrual period was normal and lasted for 4 to 5 days.  A urine pregnancy test was positive 4 weeks ago. She was not on the pill at conception.     Based on her LMP, her EDC is 24 and her EGA is 8 weeks,0 days. Her menstrual cycles are regular and occur approximately every 28 days and range from 3 to 5 days. The last menses lasted  the usual number of days.      Ultrasound data:  She had an ultrasound done by the ultrasound tech today which revealed a viable escamilla pregnancy with a gestational age of 7 weeks and 4 days giving an EDC of 25.    Pregnancy symptoms:    Since her LMP she has experienced urinary frequency, breast tenderness, and nausea.   She has not been vomiting over the last few weeks.  Associated signs and symptoms which she denies: dysuria, discharge, vaginal bleeding.    She states she has gained weight:  Approximately 5 pounds over the last few weeks.    Relevant past pregnancy history:   She has the following pregnancy history:     She has no history of  delivery.    Relevant past medical history:(relevant to this pregnancy): noncontributory.      Her occupation is: not currently working  After obtaining consent, and per orders of , injection of Flu vaccine given in R Deltoid by Bernie Mark LPN. Patient instructed to remain in clinic for 20 minutes afterwards, and to report any adverse reaction to me immediately.  1. Have you been to the ER, urgent care clinic, or hospitalized since your last visit? No    2. Have you seen or consulted any other health care providers outside of the [facility] since your last visit?

## 2024-11-14 ENCOUNTER — ROUTINE PRENATAL (OUTPATIENT)
Age: 29
End: 2024-11-14

## 2024-11-14 VITALS
HEIGHT: 65 IN | SYSTOLIC BLOOD PRESSURE: 117 MMHG | DIASTOLIC BLOOD PRESSURE: 77 MMHG | BODY MASS INDEX: 41.38 KG/M2 | HEART RATE: 87 BPM | WEIGHT: 248.4 LBS

## 2024-11-14 DIAGNOSIS — O99.210 OBESITY AFFECTING PREGNANCY, ANTEPARTUM, UNSPECIFIED OBESITY TYPE: ICD-10-CM

## 2024-11-14 DIAGNOSIS — Z23 NEED FOR VACCINATION: ICD-10-CM

## 2024-11-14 DIAGNOSIS — Z34.90 PREGNANCY, UNSPECIFIED GESTATIONAL AGE: Primary | ICD-10-CM

## 2024-11-14 DIAGNOSIS — Z36.9 ANTENATAL SCREENING ENCOUNTER: ICD-10-CM

## 2024-11-14 DIAGNOSIS — O26.891 VAGINAL DISCHARGE DURING PREGNANCY IN FIRST TRIMESTER: ICD-10-CM

## 2024-11-14 DIAGNOSIS — Z34.80 SUPERVISION OF OTHER NORMAL PREGNANCY, ANTEPARTUM: Primary | ICD-10-CM

## 2024-11-14 DIAGNOSIS — N89.8 VAGINAL DISCHARGE DURING PREGNANCY IN FIRST TRIMESTER: ICD-10-CM

## 2024-11-14 ASSESSMENT — PATIENT HEALTH QUESTIONNAIRE - PHQ9
SUM OF ALL RESPONSES TO PHQ QUESTIONS 1-9: 0
1. LITTLE INTEREST OR PLEASURE IN DOING THINGS: NOT AT ALL
SUM OF ALL RESPONSES TO PHQ QUESTIONS 1-9: 0
SUM OF ALL RESPONSES TO PHQ QUESTIONS 1-9: 0
SUM OF ALL RESPONSES TO PHQ9 QUESTIONS 1 & 2: 0
2. FEELING DOWN, DEPRESSED OR HOPELESS: NOT AT ALL
SUM OF ALL RESPONSES TO PHQ QUESTIONS 1-9: 0

## 2024-11-14 NOTE — PROGRESS NOTES
Current pregnancy history:    Bessie Mccoy is a ,  29 y.o. female  /  Patient's last menstrual period was 2024 (exact date)..  She presents for the evaluation of amenorrhea and a positive pregnancy test.    Per nursing Note:  Patient's last menstrual period was 2024 (exact date).  Last Pap: Normal 2 year(s) ago 22     LMP history:  The date of her LMP is  certain.  Her last menstrual period was normal and lasted for 4 to 5 days.  A urine pregnancy test was positive 4 weeks ago. She was not on the pill at conception.      Based on her LMP, her EDC is 24 and her EGA is 8 weeks,0 days. Her menstrual cycles are regular and occur approximately every 28 days and range from 3 to 5 days. The last menses lasted  the usual number of days.      Ultrasound data:  She had an ultrasound done by the ultrasound tech today which revealed a viable escamilla pregnancy with a gestational age of 7 weeks and 4 days giving an EDC of 25.     Pregnancy symptoms:     Since her LMP she has experienced urinary frequency, breast tenderness, and nausea.   She has not been vomiting over the last few weeks.  Associated signs and symptoms which she denies: dysuria, discharge, vaginal bleeding.     She states she has gained weight:  Approximately 5 pounds over the last few weeks.     Relevant past pregnancy history:              She has the following pregnancy history:                She has no history of  delivery.     Relevant past medical history:(relevant to this pregnancy): noncontributory.         Substance history: negative for alcohol, tobacco and street drugs.           Positive for nothing.  Exposure history: There is/are no indoor cat/s in the home.  She admits close contact with children on a regular basis.   She has not chicken pox or the vaccine in the past.   Patient denies issues with domestic violence.     Genetic Screening/Teratology Counseling: (Includes patient,

## 2024-11-15 ENCOUNTER — TELEPHONE (OUTPATIENT)
Age: 29
End: 2024-11-15

## 2024-11-15 LAB
ABO GROUP BLD: NORMAL
BLD GP AB SCN SERPL QL: NEGATIVE
ERYTHROCYTE [DISTWIDTH] IN BLOOD BY AUTOMATED COUNT: 14.2 % (ref 11.7–15.4)
HBV SURFACE AG SERPL QL IA: NEGATIVE
HCT VFR BLD AUTO: 35.7 % (ref 34–46.6)
HCV IGG SERPL QL IA: NON REACTIVE
HGB BLD-MCNC: 11.6 G/DL (ref 11.1–15.9)
HIV 1+2 AB+HIV1 P24 AG SERPL QL IA: NON REACTIVE
MCH RBC QN AUTO: 27.4 PG (ref 26.6–33)
MCHC RBC AUTO-ENTMCNC: 32.5 G/DL (ref 31.5–35.7)
MCV RBC AUTO: 84 FL (ref 79–97)
PLATELET # BLD AUTO: 346 X10E3/UL (ref 150–450)
RBC # BLD AUTO: 4.24 X10E6/UL (ref 3.77–5.28)
RH BLD: POSITIVE
RUBV IGG SERPL IA-ACNC: 1.68 INDEX
VZV IGG SER QL IA: REACTIVE
WBC # BLD AUTO: 11.3 X10E3/UL (ref 3.4–10.8)

## 2024-11-16 LAB
BACTERIA UR CULT: NO GROWTH
TREPONEMA PALLIDUM IGG+IGM AB [PRESENCE] IN SERUM OR PLASMA BY IMMUNOASSAY: NON REACTIVE

## 2024-11-18 LAB
A VAGINAE DNA VAG QL NAA+PROBE: ABNORMAL SCORE
BVAB2 DNA VAG QL NAA+PROBE: ABNORMAL SCORE
C ALBICANS DNA VAG QL NAA+PROBE: POSITIVE
C GLABRATA DNA VAG QL NAA+PROBE: NEGATIVE
C TRACH DNA SPEC QL NAA+PROBE: NEGATIVE
MEGA1 DNA VAG QL NAA+PROBE: ABNORMAL SCORE
N GONORRHOEA DNA VAG QL NAA+PROBE: NEGATIVE
T VAGINALIS DNA VAG QL NAA+PROBE: NEGATIVE

## 2024-11-18 RX ORDER — PNV NO.95/FERROUS FUM/FOLIC AC 28MG-0.8MG
1 TABLET ORAL DAILY
Qty: 90 TABLET | Refills: 5 | Status: SHIPPED | OUTPATIENT
Start: 2024-11-18

## 2024-11-20 LAB
., LABCORP: NORMAL
CYTOLOGIST CVX/VAG CYTO: NORMAL
CYTOLOGY CVX/VAG DOC CYTO: NORMAL
CYTOLOGY CVX/VAG DOC THIN PREP: NORMAL
DX ICD CODE: NORMAL
Lab: NORMAL
OTHER STN SPEC: NORMAL
STAT OF ADQ CVX/VAG CYTO-IMP: NORMAL

## 2024-12-09 ENCOUNTER — ROUTINE PRENATAL (OUTPATIENT)
Age: 29
End: 2024-12-09
Payer: COMMERCIAL

## 2024-12-09 VITALS — DIASTOLIC BLOOD PRESSURE: 74 MMHG | HEART RATE: 103 BPM | SYSTOLIC BLOOD PRESSURE: 114 MMHG

## 2024-12-09 DIAGNOSIS — Z3A.11 11 WEEKS GESTATION OF PREGNANCY: Primary | ICD-10-CM

## 2024-12-09 PROCEDURE — G8427 DOCREV CUR MEDS BY ELIG CLIN: HCPCS | Performed by: OBSTETRICS & GYNECOLOGY

## 2024-12-09 PROCEDURE — 76813 OB US NUCHAL MEAS 1 GEST: CPT | Performed by: OBSTETRICS & GYNECOLOGY

## 2024-12-09 PROCEDURE — 1036F TOBACCO NON-USER: CPT | Performed by: OBSTETRICS & GYNECOLOGY

## 2024-12-09 PROCEDURE — G8484 FLU IMMUNIZE NO ADMIN: HCPCS | Performed by: OBSTETRICS & GYNECOLOGY

## 2024-12-09 PROCEDURE — 99203 OFFICE O/P NEW LOW 30 MIN: CPT | Performed by: OBSTETRICS & GYNECOLOGY

## 2024-12-09 PROCEDURE — G8419 CALC BMI OUT NRM PARAM NOF/U: HCPCS | Performed by: OBSTETRICS & GYNECOLOGY

## 2024-12-09 NOTE — PROCEDURES
PATIENT: JOSEPH JORDAN   -  : 1995   -  DOS:2024   -  INTERPRETING PROVIDER:Artemio Marks,   Indication  ========    First Trimester, Obesity (Prepregnancy BMI 40)    Method  ======    Transabdominal ultrasound examination. View: Suboptimal view: limited by maternal body habitus    Pregnancy  =========    James pregnancy. Number of fetuses: 1    Dating  ======    LMP on: 2024  GA by LMP 11 w + 4 d  PERRY by LMP: 2025  Previous Ultrasound on: 2024  Type of prior assessment: GA  GA at prior assessment date 7 w + 4 d  GA by previous U/S 11 w + 1 d  PERRY by previous Ultrasound: 2025  Ultrasound examination on: 2024  GA by U/S based upon: CRL  GA by U/S 11 w + 3 d  PERRY by U/S: 2025  Assigned: based on the LMP, selected on 2024  Assigned GA 11 w + 4 d  Assigned PERRY: 2025    General Evaluation  ==============    Cardiac activity present  Placenta: posterior  Cord vessels: SUBOPTIMAL  Amniotic fluid: normal amount    Fetal Biometry  ============    Standard   bpm  28% Nicolaides  CRL 46.8 mm 11w 3d 21% Hadlock  NT 1.19 mm  Extended  Nasal bone: NOT VISUALIZED  MVP 2.4 cm    Fetal Anatomy  ===========    The following structures appear normal:  Stomach. Bladder.    The following structures were visualized:  Cranium: Midline falx  Choroid plexus. Face: Profile. Abdominal wall: Intact. Arms. Legs.    Maternal Structures  ===============    Ovaries / Tubes / Adnexa  Rt ovary: Not visualized  Lt ovary: Normal  Lt ovary D1 3.5 cm  Lt ovary D2 3.4 cm  Lt ovary D3 2.2 cm  Lt ovary Vol 13.5 cm³    Findings  =======    Intrauterine James pregnancy at 11w 4d by clinical dates.  NT measures 1.19 mm.  Anatomy visualized as stated above.  Placental site is posterior.    We reviewed the findings and discussed the diagnostic limitations of the obstetric ultrasound.    Consultation  ==========    JOSEPH is 29 yrs of age,  at 11w 4d. She is referred

## 2024-12-12 ENCOUNTER — ROUTINE PRENATAL (OUTPATIENT)
Age: 29
End: 2024-12-12

## 2024-12-12 VITALS
BODY MASS INDEX: 41.44 KG/M2 | HEART RATE: 85 BPM | SYSTOLIC BLOOD PRESSURE: 131 MMHG | WEIGHT: 249 LBS | DIASTOLIC BLOOD PRESSURE: 81 MMHG

## 2024-12-12 DIAGNOSIS — Z3A.12 12 WEEKS GESTATION OF PREGNANCY: ICD-10-CM

## 2024-12-12 DIAGNOSIS — Z36.9 ENCOUNTER FOR ANTENATAL SCREENING OF MOTHER: ICD-10-CM

## 2024-12-12 DIAGNOSIS — O99.210 OBESITY AFFECTING PREGNANCY, ANTEPARTUM, UNSPECIFIED OBESITY TYPE: ICD-10-CM

## 2024-12-12 DIAGNOSIS — Z34.80 SUPERVISION OF OTHER NORMAL PREGNANCY, ANTEPARTUM: Primary | ICD-10-CM

## 2024-12-12 PROCEDURE — 0502F SUBSEQUENT PRENATAL CARE: CPT | Performed by: OBSTETRICS & GYNECOLOGY

## 2024-12-12 RX ORDER — PNV NO.95/FERROUS FUM/FOLIC AC 28MG-0.8MG
1 TABLET ORAL DAILY
Qty: 90 TABLET | Refills: 5 | Status: SHIPPED | OUTPATIENT
Start: 2024-12-12

## 2024-12-12 NOTE — PROGRESS NOTES
EDC 6/26/2025 D=US  NIPTS  Horizon - sickle carrier, FOB negative  Obesity BMI >40  MFM  Baby ASA  Early glucola  Flu vaccine 11/14/24

## 2024-12-13 LAB — GLUCOSE 1H P 100 G GLC PO SERPL-MCNC: 103 MG/DL (ref 65–140)

## 2025-01-09 ENCOUNTER — ROUTINE PRENATAL (OUTPATIENT)
Age: 30
End: 2025-01-09

## 2025-01-09 VITALS
WEIGHT: 247.8 LBS | TEMPERATURE: 98.3 F | RESPIRATION RATE: 20 BRPM | SYSTOLIC BLOOD PRESSURE: 130 MMHG | DIASTOLIC BLOOD PRESSURE: 82 MMHG | HEIGHT: 65 IN | HEART RATE: 109 BPM | BODY MASS INDEX: 41.29 KG/M2

## 2025-01-09 DIAGNOSIS — O99.210 OBESITY AFFECTING PREGNANCY, ANTEPARTUM, UNSPECIFIED OBESITY TYPE: ICD-10-CM

## 2025-01-09 DIAGNOSIS — Z3A.16 16 WEEKS GESTATION OF PREGNANCY: ICD-10-CM

## 2025-01-09 DIAGNOSIS — Z34.80 SUPERVISION OF OTHER NORMAL PREGNANCY, ANTEPARTUM: Primary | ICD-10-CM

## 2025-01-09 DIAGNOSIS — Z36.9 ENCOUNTER FOR ANTENATAL SCREENING OF MOTHER: ICD-10-CM

## 2025-01-09 PROCEDURE — 0502F SUBSEQUENT PRENATAL CARE: CPT | Performed by: OBSTETRICS & GYNECOLOGY

## 2025-01-09 NOTE — PROGRESS NOTES
EDC 6/26/2025 D=US  NIPTS normal female  Horizon - sickle carrier, FOB negative  Obesity BMI >40  MFM  Baby ASA  Early glucola normal 103  Flu vaccine 11/14/24

## 2025-01-12 LAB
AFP INTERP SERPL-IMP: NORMAL
AFP MOM SERPL: 1.23
AFP SERPL-MCNC: 30.2 NG/ML
AGE AT DELIVERY: 27.6 YR
AGE OF EGG DONOR: NORMAL
AGE OF EGG DONOR: NORMAL
COMMENT: NORMAL
DONOR EGG?: NO
DONOR EGG?: NORMAL
FAMILY HISTORY NTD: NORMAL
FHX NTD (Y OR N): NORMAL
GA METHOD: NORMAL
GA: 16 WEEKS
IDDM PATIENT QL: NO
INSULIN DEP. DIABETIC: NORMAL
Lab: 247
Lab: NORMAL
Lab: NORMAL
MAT SCN FOR FETAL ABNORMALITIES SERPL: NORMAL
MULTIPLE PREGNANCY: NO
NEURAL TUBE DEFECT RISK FETUS: 5926
NUMBER OF FETUSES: NO
OTHER INDICATIONS: NO
OTHER INDICATIONS: NORMAL
PREVIOUSLY ELEVATED AFP (Y OR N): 16
PREVIOUSLY ELEVATED AFP (Y OR N): NO
PRIOR 1ST TRIM TESTING ?: NO
PRIOR 2ND TRIM TESTING ?: NO
PRIOR DS/NTD SCREEN CURRENT PREGNANCY?: NO
PRIOR DS/NTD SCREEN CURRENT PREGNANCY?: NORMAL
PRIOR FIRST TRIMESTER TESTING (Y OR N ): NORMAL
PRIOR PREGNANCY WITH DOWN SYNDROME (Y OR N): 1
PRIOR PREGNANCY WITH DOWN SYNDROME (Y OR N): NO
TYPE OF EGG DONOR: NORMAL
TYPE OF EGG DONOR: NORMAL

## 2025-02-07 ENCOUNTER — ROUTINE PRENATAL (OUTPATIENT)
Age: 30
End: 2025-02-07

## 2025-02-07 VITALS — HEART RATE: 84 BPM | SYSTOLIC BLOOD PRESSURE: 106 MMHG | DIASTOLIC BLOOD PRESSURE: 64 MMHG

## 2025-02-07 VITALS
SYSTOLIC BLOOD PRESSURE: 121 MMHG | BODY MASS INDEX: 42.8 KG/M2 | HEART RATE: 91 BPM | WEIGHT: 257.2 LBS | DIASTOLIC BLOOD PRESSURE: 82 MMHG

## 2025-02-07 DIAGNOSIS — Z3A.20 20 WEEKS GESTATION OF PREGNANCY: ICD-10-CM

## 2025-02-07 DIAGNOSIS — Z34.80 SUPERVISION OF OTHER NORMAL PREGNANCY, ANTEPARTUM: Primary | ICD-10-CM

## 2025-02-07 DIAGNOSIS — Z3A.20 20 WEEKS GESTATION OF PREGNANCY: Primary | ICD-10-CM

## 2025-02-07 DIAGNOSIS — O99.210 OBESITY AFFECTING PREGNANCY, ANTEPARTUM, UNSPECIFIED OBESITY TYPE: ICD-10-CM

## 2025-02-07 PROCEDURE — 0502F SUBSEQUENT PRENATAL CARE: CPT | Performed by: OBSTETRICS & GYNECOLOGY

## 2025-02-07 NOTE — PROGRESS NOTES
Please see ultrasound report under Imaging tab or Media tab.  Janna Baires MD  [facility]

## 2025-02-07 NOTE — PROCEDURES
PATIENT: JOSEPH JORDAN   -  : 1995   -  DOS:2025   -  INTERPRETING PROVIDER:Janna Baires,   Indication  ========    Obesity (Prepregnancy BMI 40)    Method  ======    Transabdominal ultrasound examination. View: suboptimal due to maternal acoustic properties. suboptimal due to unfavorable fetal position. suboptimal shadowing from  fetal limbs    Dating  ======    LMP on: 2024  GA by LMP 20 w + 1 d  PERRY by LMP: 2025  Previous Ultrasound on: 2024  Type of prior assessment: GA  GA at prior assessment date 7 w + 4 d  GA by previous U/S 19 w + 5 d  PERRY by previous Ultrasound: 2025  Ultrasound examination on: 2025  GA by U/S based upon: AC, BPD, Femur, HC  GA by U/S 20 w + 1 d  PERRY by U/S: 2025  Assigned: based on the LMP, selected on 2024  Assigned GA 20 w + 1 d  Assigned PERRY: 2025    Fetal Growth Overview  =================    Exam date        GA              BPD (mm)          HC (mm)              AC (mm)              FL (mm)             HL (mm)          EFW (g)  2025          20w 1d        45.9    38%        167.7     14%        160.2    76%        31.7     32%        33    85%        352     60%    Fetal Biometry  ============    Standard  BPD 45.9 mm 19w 6d 38% Hadlock  OFD 58.7 mm 20w 3d 60% Deja  .7 mm 19w 3d 14% Hadlock  Cerebellum tr 19.5 mm 18w 6d 30% Hill  Nuchal fold 4.1 mm  .2 mm 21w 1d 76% Hadlock  Femur 31.7 mm 19w 6d 32% Hadlock  Humerus 33.0 mm 21w 1d 85% Deja   g 20w 2d 60% Hadlock  EFW (lb) 0 lb  EFW (oz) 12 oz  EFW by: Hadlock (BPD-HC-AC-FL)  Extended   4.9 mm  CM 7.0 mm  94% Nicolaides  Nasal bone 7.3 mm  Head / Face / Neck  Nasal bone: present  Other Structures   bpm    General Evaluation  ==============    Cardiac activity present.  bpm. Fetal movements: visualized. Presentation: Transverse, head to maternal right  Placenta: Placental site: posterior, appropriate distance from the internal

## 2025-03-06 DIAGNOSIS — Z34.90 PREGNANCY, UNSPECIFIED GESTATIONAL AGE: Primary | ICD-10-CM

## 2025-03-07 ENCOUNTER — ROUTINE PRENATAL (OUTPATIENT)
Age: 30
End: 2025-03-07

## 2025-03-07 VITALS
SYSTOLIC BLOOD PRESSURE: 117 MMHG | BODY MASS INDEX: 43 KG/M2 | WEIGHT: 258.4 LBS | DIASTOLIC BLOOD PRESSURE: 77 MMHG | HEART RATE: 96 BPM

## 2025-03-07 VITALS — HEART RATE: 95 BPM | DIASTOLIC BLOOD PRESSURE: 79 MMHG | SYSTOLIC BLOOD PRESSURE: 123 MMHG

## 2025-03-07 DIAGNOSIS — Z34.90 PREGNANCY, UNSPECIFIED GESTATIONAL AGE: ICD-10-CM

## 2025-03-07 DIAGNOSIS — Z3A.24 24 WEEKS GESTATION OF PREGNANCY: ICD-10-CM

## 2025-03-07 DIAGNOSIS — Z34.80 SUPERVISION OF OTHER NORMAL PREGNANCY, ANTEPARTUM: Primary | ICD-10-CM

## 2025-03-07 DIAGNOSIS — O99.210 OBESITY AFFECTING PREGNANCY, ANTEPARTUM, UNSPECIFIED OBESITY TYPE: ICD-10-CM

## 2025-03-07 NOTE — PROGRESS NOTES
Patient Active Problem List    Diagnosis Date Noted    Supervision of other normal pregnancy, antepartum 11/14/2024     Overview Note:     EDC 6/26/2025 D=US  NIPTS normal female  Horizon - sickle carrier, FOB negative  Obesity BMI >40  MFM  Baby ASA  Early glucola normal 103  Flu vaccine 11/14/24

## 2025-03-07 NOTE — PROCEDURES
presentation.    Previously, the fetal anatomy survey was incomplete. Today, the remaining structures were visualized and appear to be normal within the limitations of ultrasound  technology.    The ultrasound findings as listed above and diagnostic limitations of ultrasound imaging, including inability to exclude all anomalies, have been reviewed with the patient. All  questions and concerns addressed.    Consultation  ==========    JOSEPH is 29 yrs of age,  at 24w 1d. She is referred because of maternal obesity, BMI 41. She denies any current medical conditions or prior pregnancy  complications. She has delivered vaginally previously.    Today's ultrasound is consistent with known dating. Assessing the nasal bone and the NT measurement is challenging but the NT appears normal. The nasal bone is not  adequately seen due to early gestation and feal position.    Regarding maternal obesity,  Maternal obesity is associated with a variety of obstetric complications including: increased miscarriage and stillbirth, fetal anomalies, fetal growth abnormalities, primarily  large infants, increased labor, delivery, and anesthesia complications, and diabetes.    Follow up ultrasound for incomplete fetal anatomy. Assessment now complete with no structural abnormalities seen. Biometry is appropriate.    Plan:  Follow up of fetal anatomy at 20 weeks.  Completion of NIPT recommended  Serial assessment of fetal growth.   surveillance starting by 34 weeks gestation.    Recommendations  ==============    - Follow up growth/remaining anatomy in 4w  - Recommend serial growth cans q4 weeks starting at 28 weeks  -  testing weekly starting at 34 weeks  - Delivery: 39.0-39.6    Coding  ======    Code: 52841  Description: Ultrasound, pregnant uterus, real time with image documentation, follow up, transabdominal approach per fetus

## 2025-03-27 ENCOUNTER — ROUTINE PRENATAL (OUTPATIENT)
Age: 30
End: 2025-03-27
Payer: COMMERCIAL

## 2025-03-27 VITALS — WEIGHT: 264 LBS | DIASTOLIC BLOOD PRESSURE: 79 MMHG | SYSTOLIC BLOOD PRESSURE: 123 MMHG | BODY MASS INDEX: 43.93 KG/M2

## 2025-03-27 DIAGNOSIS — Z34.80 SUPERVISION OF OTHER NORMAL PREGNANCY, ANTEPARTUM: Primary | ICD-10-CM

## 2025-03-27 DIAGNOSIS — O99.210 OBESITY AFFECTING PREGNANCY, ANTEPARTUM, UNSPECIFIED OBESITY TYPE: ICD-10-CM

## 2025-03-27 DIAGNOSIS — Z3A.27 27 WEEKS GESTATION OF PREGNANCY: ICD-10-CM

## 2025-03-27 PROCEDURE — 90715 TDAP VACCINE 7 YRS/> IM: CPT | Performed by: OBSTETRICS & GYNECOLOGY

## 2025-03-27 PROCEDURE — 0502F SUBSEQUENT PRENATAL CARE: CPT | Performed by: OBSTETRICS & GYNECOLOGY

## 2025-03-27 PROCEDURE — 90471 IMMUNIZATION ADMIN: CPT | Performed by: OBSTETRICS & GYNECOLOGY

## 2025-03-27 NOTE — PROGRESS NOTES
Patient Active Problem List    Diagnosis Date Noted    Supervision of other normal pregnancy, antepartum 11/14/2024     Overview Note:     EDC 6/26/2025 D=US  NIPTS normal female  Horizon - sickle carrier, FOB negative  Obesity BMI >40  MFM  Baby ASA  Early glucola normal 103  Flu vaccine 11/14/24       After obtaining consent, and per orders of , injection of Tdap given in left deltoid by Bernie Mark LPN. Patient instructed to remain in clinic for 20 minutes afterwards, and to report any adverse reaction to me immediately. Lot: [id number] Exp: 04/19/27 NDC: 55914-060-03 , VIS given.

## 2025-03-28 ENCOUNTER — HOSPITAL ENCOUNTER (EMERGENCY)
Facility: HOSPITAL | Age: 30
Discharge: HOME | End: 2025-03-29
Payer: COMMERCIAL

## 2025-03-28 ENCOUNTER — HOSPITAL ENCOUNTER (EMERGENCY)
Facility: HOSPITAL | Age: 30
Discharge: OTHER INSTITUTION | End: 2025-03-28

## 2025-03-28 VITALS
HEIGHT: 65 IN | SYSTOLIC BLOOD PRESSURE: 153 MMHG | RESPIRATION RATE: 18 BRPM | TEMPERATURE: 98.6 F | HEART RATE: 88 BPM | BODY MASS INDEX: 44.55 KG/M2 | WEIGHT: 267.42 LBS | DIASTOLIC BLOOD PRESSURE: 69 MMHG | OXYGEN SATURATION: 98 %

## 2025-03-28 LAB
BASOPHILS # BLD: 0.03 K/UL (ref 0–0.1)
BASOPHILS NFR BLD: 0.3 % (ref 0–1)
DIFFERENTIAL METHOD BLD: ABNORMAL
EOSINOPHIL # BLD: 0.13 K/UL (ref 0–0.4)
EOSINOPHIL NFR BLD: 1.2 % (ref 0–7)
ERYTHROCYTE [DISTWIDTH] IN BLOOD BY AUTOMATED COUNT: 14.9 % (ref 11.5–14.5)
GLUCOSE 1H P 100 G GLC PO SERPL-MCNC: 107 MG/DL (ref 65–140)
HCT VFR BLD AUTO: 32.2 % (ref 35–47)
HGB BLD-MCNC: 10.7 G/DL (ref 11.5–16)
IMM GRANULOCYTES # BLD AUTO: 0.05 K/UL (ref 0–0.04)
IMM GRANULOCYTES NFR BLD AUTO: 0.5 % (ref 0–0.5)
LYMPHOCYTES # BLD: 1.88 K/UL (ref 0.8–3.5)
LYMPHOCYTES NFR BLD: 17.2 % (ref 12–49)
MCH RBC QN AUTO: 28.8 PG (ref 26–34)
MCHC RBC AUTO-ENTMCNC: 33.2 G/DL (ref 30–36.5)
MCV RBC AUTO: 86.6 FL (ref 80–99)
MONOCYTES # BLD: 0.59 K/UL (ref 0–1)
MONOCYTES NFR BLD: 5.4 % (ref 5–13)
NEUTS SEG # BLD: 8.27 K/UL (ref 1.8–8)
NEUTS SEG NFR BLD: 75.4 % (ref 32–75)
NRBC # BLD: 0 K/UL (ref 0–0.01)
NRBC BLD-RTO: 0 PER 100 WBC
PLATELET # BLD AUTO: 284 K/UL (ref 150–400)
PMV BLD AUTO: 10.6 FL (ref 8.9–12.9)
RBC # BLD AUTO: 3.72 M/UL (ref 3.8–5.2)
WBC # BLD AUTO: 11 K/UL (ref 3.6–11)

## 2025-03-29 ENCOUNTER — RESULTS FOLLOW-UP (OUTPATIENT)
Age: 30
End: 2025-03-29

## 2025-03-29 VITALS
TEMPERATURE: 98.7 F | SYSTOLIC BLOOD PRESSURE: 119 MMHG | DIASTOLIC BLOOD PRESSURE: 59 MMHG | HEART RATE: 82 BPM | RESPIRATION RATE: 16 BRPM

## 2025-03-29 DIAGNOSIS — Z34.80 SUPERVISION OF OTHER NORMAL PREGNANCY, ANTEPARTUM: Primary | ICD-10-CM

## 2025-03-29 LAB — MEV IGG SER IA-ACNC: <13.5 AU/ML

## 2025-03-29 PROCEDURE — 99283 EMERGENCY DEPT VISIT LOW MDM: CPT

## 2025-03-29 RX ORDER — FERROUS SULFATE 325(65) MG
325 TABLET ORAL DAILY
Qty: 90 TABLET | Refills: 5 | Status: SHIPPED | OUTPATIENT
Start: 2025-03-29

## 2025-03-29 NOTE — ED PROVIDER NOTES
History & Physical    Name: Bessie Mccoy MRN: [id number]  SSN: [id number]    YOB: 1995  Age: 29 y.o.  Sex: female      Subjective:     Reason for Admission:  Pregnancy and Abdominal Pain     History of Present Illness: Bessie Mccoy is a 29 y.o.  female with an estimated gestational age of 27w2d with Estimated Date of Delivery: 25. Patient complains of moderate abdominal pain for 1 days. Pregnancy has been complicated by  h/o Polyhydramnios last pregnancy and Fall 2 days ago .  Patient denies abdominal pain  , contractions, vaginal bleeding , and vaginal leaking of fluid . He pain lasted about 30 minutes but is now all gone.     OB History          4    Para   1    Term   1            AB   2    Living   1         SAB        IAB   2    Ectopic        Molar        Multiple        Live Births   1              Past Medical History:   Diagnosis Date    Papanicolaou smear for cervical cancer screening 2024    Normal     Past Surgical History:   Procedure Laterality Date    APPENDECTOMY  2018     Social History     Occupational History    Not on file   Tobacco Use    Smoking status: Never    Smokeless tobacco: Never   Substance and Sexual Activity    Alcohol use: Not Currently    Drug use: Never    Sexual activity: Yes     Partners: Male     Birth control/protection: None     Family History   Problem Relation Age of Onset    Hypertension Maternal Grandfather     Hypertension Mother     Asthma Father        No Known Allergies  Prior to Admission medications    Medication Sig Start Date End Date Taking? Authorizing Provider   Prenatal Vit-Fe Fumarate-FA (PRENATAL VITAMIN) 27-0.8 MG TABS Take 1 tablet by mouth daily 24  Yes Myesha Perez MD   Aspirin 81 MG CAPS Take 1 tablet by mouth daily  Patient not taking: Reported on 3/28/2025 12/12/24   Myesha Perez MD        Review of Systems:  No LOF  No VB  Pos FM    Objective:     Vitals:    Vitals:    25  2335   BP: (!) 119/59   Pulse: 82   Resp: 16   Temp: 98.7 °F (37.1 °C)   TempSrc: Oral      Temp (24hrs), Av.7 °F (37.1 °C), Min:98.6 °F (37 °C), Max:98.7 °F (37.1 °C)    BP  Min: 119/59  Max: 153/69     [unfilled]    Cervical Exam: deferred  Uterine Activity: None  Membranes: Intact  Fetal Heart Rate: Reactive  Baseline: 130 per minute  Variability: moderate  Accelerations: yes  Decelerations: none  Uterine contractions: none       Labs: No results found for this or any previous visit (from the past 24 hours).    Patient Active Problem List   Diagnosis    Supervision of other normal pregnancy, antepartum     Assessment and Plan:     30 y/o  @ 27w2d  S/P fall 2 days ago, had Dr Visit yesterday and was told to come in if it happened again  No pain on admission to [facility]  Cat 1 NST  Non tender Uterus  Reassurance given- Return for persistent or reoccurring symptoms      Signed By:  Osmel Cardona MD     2025                 lemuel

## 2025-03-31 LAB — T PALLIDUM AB SER QL IA: NON REACTIVE

## 2025-04-02 ENCOUNTER — ROUTINE PRENATAL (OUTPATIENT)
Age: 30
End: 2025-04-02
Payer: COMMERCIAL

## 2025-04-02 VITALS — SYSTOLIC BLOOD PRESSURE: 129 MMHG | DIASTOLIC BLOOD PRESSURE: 75 MMHG | HEART RATE: 111 BPM

## 2025-04-02 DIAGNOSIS — O26.90 PREGNANCY COMPLICATION, ANTEPARTUM: ICD-10-CM

## 2025-04-02 DIAGNOSIS — Z34.90 PREGNANCY, UNSPECIFIED GESTATIONAL AGE: ICD-10-CM

## 2025-04-02 DIAGNOSIS — O99.210 OBESITY IN PREGNANCY, ANTEPARTUM: Primary | ICD-10-CM

## 2025-04-02 PROCEDURE — G8427 DOCREV CUR MEDS BY ELIG CLIN: HCPCS | Performed by: STUDENT IN AN ORGANIZED HEALTH CARE EDUCATION/TRAINING PROGRAM

## 2025-04-02 PROCEDURE — 76816 OB US FOLLOW-UP PER FETUS: CPT | Performed by: STUDENT IN AN ORGANIZED HEALTH CARE EDUCATION/TRAINING PROGRAM

## 2025-04-02 PROCEDURE — 99214 OFFICE O/P EST MOD 30 MIN: CPT | Performed by: STUDENT IN AN ORGANIZED HEALTH CARE EDUCATION/TRAINING PROGRAM

## 2025-04-02 PROCEDURE — G8419 CALC BMI OUT NRM PARAM NOF/U: HCPCS | Performed by: STUDENT IN AN ORGANIZED HEALTH CARE EDUCATION/TRAINING PROGRAM

## 2025-04-02 PROCEDURE — 1036F TOBACCO NON-USER: CPT | Performed by: STUDENT IN AN ORGANIZED HEALTH CARE EDUCATION/TRAINING PROGRAM

## 2025-04-02 NOTE — PROGRESS NOTES
Patient was seen 4/2/2025      Please look under media to view full consult and ultrasound report in ViewPoint.         Destiny England MD   Maternal Fetal Medicine

## 2025-04-02 NOTE — PROCEDURES
PATIENT: JOSEPH JORDAN   -  : 1995   -  DOS:2025   -  INTERPRETING PROVIDER:Destiny England,   Indication  ========    Obesity (Prepregnancy BMI 40)    Method  ======    Transabdominal ultrasound examination. View: Sufficient    Pregnancy  =========    James pregnancy. Number of fetuses: 1    Dating  ======    LMP on: 2024  GA by LMP 27 w + 6 d  PERRY by LMP: 2025  Previous Ultrasound on: 2024  Type of prior assessment: GA  GA at prior assessment date 7 w + 4 d  GA by previous U/S 27 w + 3 d  PERRY by previous Ultrasound: 2025  Ultrasound examination on: 2025  GA by U/S based upon: AC, BPD, Femur, HC  GA by U/S 28 w + 1 d  PERRY by U/S: 2025  Assigned: based on the LMP, selected on 2024  Assigned GA 27 w + 6 d  Assigned PERRY: 2025    Fetal Biometry  ============    Standard  BPD 67.6 mm 27w 2d 19% Hadlock  OFD 91.0 mm 29w 2d 86% Deja  .4 mm 27w 4d 13% Hadlock  .6 mm 29w 1d 81% Hadlock  Femur 54.4 mm 28w 5d 62% Hadlock  EFW 1,275 g 28w 3d 71% Hadlock  EFW (lb) 2 lb  EFW (oz) 13 oz  EFW by: Hadlock (BPD-HC-AC-FL)  Other Structures   bpm    General Evaluation  ==============    Cardiac activity present.  bpm. Fetal movements: visualized. Presentation: Cephalic  Placenta: Placental site: posterior, appropriate distance from the internal os  Umbilical cord: Cord vessels: 3 vessel cord. Insertion site: central  Amniotic fluid: Amount of AF: normal. MVP 4.5 cm. YAJAIRA 16.2 cm. Q1 3.6 cm, Q2 4.4 cm, Q3 3.7 cm, Q4 4.5 cm    Fetal Anatomy  ===========    Cord insertion: normal  Stomach: normal  Kidneys: normal  Bladder: normal  Wants to know fetal sex: yes    Findings  =======    Intrauterine James pregnancy at 27w 6d by clinical dates.  EFW is 1275 g at 71%, abdominal circumference at 81%.  Amniotic fluid: normal.  Placenta is posterior, appropriate distance from the internal os.  Cephalic presentation.    Detailed anatomic survey was

## 2025-04-17 ENCOUNTER — ROUTINE PRENATAL (OUTPATIENT)
Age: 30
End: 2025-04-17

## 2025-04-17 VITALS
SYSTOLIC BLOOD PRESSURE: 119 MMHG | BODY MASS INDEX: 44.76 KG/M2 | DIASTOLIC BLOOD PRESSURE: 76 MMHG | WEIGHT: 269 LBS | HEART RATE: 100 BPM

## 2025-04-17 DIAGNOSIS — Z3A.30 30 WEEKS GESTATION OF PREGNANCY: ICD-10-CM

## 2025-04-17 DIAGNOSIS — Z34.80 SUPERVISION OF OTHER NORMAL PREGNANCY, ANTEPARTUM: Primary | ICD-10-CM

## 2025-04-17 PROCEDURE — 0502F SUBSEQUENT PRENATAL CARE: CPT | Performed by: OBSTETRICS & GYNECOLOGY

## 2025-04-17 RX ORDER — TERCONAZOLE 8 MG/G
CREAM VAGINAL
Qty: 20 G | Refills: 0 | Status: SHIPPED | OUTPATIENT
Start: 2025-04-17

## 2025-04-17 NOTE — PROGRESS NOTES
Patient Active Problem List    Diagnosis Date Noted    Supervision of other normal pregnancy, antepartum 11/14/2024     Overview Note:     EDC 6/26/2025 D=US  NIPTS normal female  Horizon - sickle carrier, FOB negative  Obesity BMI >40  MFM  Baby ASA  Early glucola normal 103  Flu vaccine 11/14/24  Anemia  Measles NONIMMUNE - MMR pp

## 2025-04-30 ENCOUNTER — ROUTINE PRENATAL (OUTPATIENT)
Age: 30
End: 2025-04-30

## 2025-04-30 VITALS — SYSTOLIC BLOOD PRESSURE: 127 MMHG | HEART RATE: 87 BPM | DIASTOLIC BLOOD PRESSURE: 78 MMHG

## 2025-04-30 DIAGNOSIS — O99.210 OBESITY AFFECTING PREGNANCY, ANTEPARTUM, UNSPECIFIED OBESITY TYPE: Primary | ICD-10-CM

## 2025-04-30 DIAGNOSIS — Z34.90 PREGNANCY, UNSPECIFIED GESTATIONAL AGE: ICD-10-CM

## 2025-04-30 DIAGNOSIS — O40.9XX0 POLYHYDRAMNIOS AFFECTING PREGNANCY: ICD-10-CM

## 2025-04-30 NOTE — PROCEDURES
PATIENT: JOSEPH JORDAN   -  : 1995   -  DOS:2025   -  INTERPRETING PROVIDER:Destiny England,   Indication  ========    Obesity (Prepregnancy BMI 40)    Method  ======    Transabdominal ultrasound examination. View: suboptimal due to maternal acoustic properties    Pregnancy  =========    James pregnancy. Number of fetuses: 1    Dating  ======    LMP on: 2024  GA by LMP 31 w + 6 d  PERRY by LMP: 2025  Previous Ultrasound on: 2024  Type of prior assessment: GA  GA at prior assessment date 7 w + 4 d  GA by previous U/S 31 w + 3 d  PERRY by previous Ultrasound: 2025  Ultrasound examination on: 2025  GA by U/S based upon: AC, BPD, Femur, HC  GA by U/S 32 w + 6 d  PERRY by U/S: 2025  Assigned: based on the LMP, selected on 2024  Assigned GA 31 w + 6 d  Assigned PERRY: 2025    Fetal Biometry  ============    Standard  BPD 78.7 mm 31w 4d 32% Hadlock  .4 mm 33w 5d 87% Deja  .4 mm 32w 1d 20% Hadlock  .8 mm 34w 4d 98% Hadlock  Femur 64.3 mm 33w 1d 73% Hadlock  EFW 2,247 g 33w 3d 90% Hadlock  EFW (lb) 4 lb  EFW (oz) 15 oz  EFW by: Hadlock (BPD-HC-AC-FL)  Other Structures   bpm    General Evaluation  ==============    Cardiac activity present.  bpm. Fetal movements: visualized. Presentation: Cephalic  Placenta: Placental site: posterior, appropriate distance from the internal os  Umbilical cord: Cord vessels: 3 vessel cord. Insertion site: central  Amniotic fluid: Amount of AF: mild polyhydramnios. MVP 8.0 cm. YAJAIRA 28.3 cm. Q1 7.9 cm, Q2 5.7 cm, Q3 6.8 cm, Q4 8.0 cm    Fetal Anatomy  ===========    Stomach: normal  Kidneys: normal  Bladder: normal  Wants to know fetal sex: yes    Findings  =======    Intrauterine James pregnancy at 31w 6d by clinical dates.  EFW is 2247 g at 90%, abdominal circumference at 98%.  Amniotic fluid: mild polyhydramnios.  Placenta is posterior, appropriate distance from the internal os.  Cephalic

## 2025-04-30 NOTE — PROGRESS NOTES
Patient was seen 4/30/2025      Please look under media to view full consult and ultrasound report in ViewPoint.         Destiny England MD   Maternal Fetal Medicine

## 2025-05-01 ENCOUNTER — ROUTINE PRENATAL (OUTPATIENT)
Age: 30
End: 2025-05-01

## 2025-05-01 VITALS — DIASTOLIC BLOOD PRESSURE: 78 MMHG | SYSTOLIC BLOOD PRESSURE: 122 MMHG | WEIGHT: 270.6 LBS | BODY MASS INDEX: 45.03 KG/M2

## 2025-05-01 DIAGNOSIS — O99.210 OBESITY AFFECTING PREGNANCY, ANTEPARTUM, UNSPECIFIED OBESITY TYPE: ICD-10-CM

## 2025-05-01 DIAGNOSIS — Z34.80 SUPERVISION OF OTHER NORMAL PREGNANCY, ANTEPARTUM: Primary | ICD-10-CM

## 2025-05-01 DIAGNOSIS — Z3A.32 32 WEEKS GESTATION OF PREGNANCY: ICD-10-CM

## 2025-05-01 PROCEDURE — 0502F SUBSEQUENT PRENATAL CARE: CPT | Performed by: OBSTETRICS & GYNECOLOGY

## 2025-05-07 ENCOUNTER — ROUTINE PRENATAL (OUTPATIENT)
Age: 30
End: 2025-05-07

## 2025-05-07 VITALS — HEART RATE: 103 BPM | SYSTOLIC BLOOD PRESSURE: 123 MMHG | DIASTOLIC BLOOD PRESSURE: 75 MMHG

## 2025-05-07 DIAGNOSIS — Z34.90 PREGNANCY, UNSPECIFIED GESTATIONAL AGE: ICD-10-CM

## 2025-05-07 NOTE — PROGRESS NOTES
Patient was seen 5/7/2025      Please look under media to view full consult and ultrasound report in ViewPoint.         Linwood Rich MD  Maternal Fetal Medicine

## 2025-05-07 NOTE — PROCEDURES
PATIENT: JOSEPH JORDAN   -  : 1995   -  DOS:2025   -  INTERPRETING PROVIDER:Linwood Rich,   Indication  ========    Obesity (Pre-pregnancy BMI 40)    Maternal Assessment  =================    Height (ft) 5 ft  Height (in) 6 in    Method  ======    Transabdominal ultrasound examination. View: Good view    Pregnancy  =========    James pregnancy. Number of fetuses: 1    Dating  ======    LMP on: 2024  GA by LMP 32 w + 6 d  PERRY by LMP: 2025  Previous Ultrasound on: 2024  Type of prior assessment: GA  GA at prior assessment date 7 w + 4 d  GA by previous U/S 32 w + 3 d  PERRY by previous Ultrasound: 2025  Assigned: based on the LMP, selected on 2024  Assigned GA 32 w + 6 d  Assigned PERRY: 2025    General Evaluation  ==============    Cardiac activity present.  bpm. Fetal movements: visualized. Presentation: Cephalic  Placenta: Placental site: posterior, appropriate distance from the internal os  Umbilical cord: Cord vessels: 3 vessel cord    Fetal Anatomy  ===========    Stomach: normal  Kidneys: normal  Bladder: normal  Wants to know fetal sex: yes    Amniotic Fluid Assessment  =====================    Amount of AF: mild polyhydramnios  MVP 7.1 cm. YAJAIRA 24.6 cm. Q1 7.1 cm, Q2 6.7 cm, Q3 6.2 cm, Q4 4.6 cm    Biophysical Profile  ==============    2: Fetal breathing movements  2: Gross body movements  2: Fetal tone  2: Amniotic fluid volume  8/8 Biophysical profile score    Findings  =======    Intrauterine James pregnancy at 32w 6d by clinical dates.  Amniotic fluid: mild polyhydramnios.  Placenta is posterior, appropriate distance from the internal os.  Cephalic presentation.  Biophysical profile score is 8/8.    The ultrasound findings as listed above and diagnostic limitations of ultrasound imaging, including inability to exclude all anomalies, have been reviewed with the patient. All  questions and concerns

## 2025-05-14 ENCOUNTER — ROUTINE PRENATAL (OUTPATIENT)
Age: 30
End: 2025-05-14

## 2025-05-14 VITALS — DIASTOLIC BLOOD PRESSURE: 80 MMHG | WEIGHT: 270 LBS | BODY MASS INDEX: 44.93 KG/M2 | SYSTOLIC BLOOD PRESSURE: 115 MMHG

## 2025-05-14 VITALS — HEART RATE: 118 BPM | SYSTOLIC BLOOD PRESSURE: 117 MMHG | DIASTOLIC BLOOD PRESSURE: 75 MMHG

## 2025-05-14 DIAGNOSIS — O99.210 OBESITY AFFECTING PREGNANCY, ANTEPARTUM, UNSPECIFIED OBESITY TYPE: ICD-10-CM

## 2025-05-14 DIAGNOSIS — Z34.90 PREGNANCY, UNSPECIFIED GESTATIONAL AGE: ICD-10-CM

## 2025-05-14 DIAGNOSIS — Z34.80 SUPERVISION OF OTHER NORMAL PREGNANCY, ANTEPARTUM: Primary | ICD-10-CM

## 2025-05-14 DIAGNOSIS — Z3A.33 33 WEEKS GESTATION OF PREGNANCY: ICD-10-CM

## 2025-05-14 PROCEDURE — 0502F SUBSEQUENT PRENATAL CARE: CPT | Performed by: OBSTETRICS & GYNECOLOGY

## 2025-05-14 NOTE — PROGRESS NOTES
Doing well. Sees MFM today  Baby moving  GBS next visit 2 weeks   History:  Raul is a 6 year old male who presents to the office for a consult and preop exam. Raul will be having a dental procedure performed under general anesthesia at [facility] on March 3, 2020 with Dr. Mason Cárdenas.  At today's visit the patient has no concerns.  He has had no fever, cough, or shortness of breath.  No vomiting or abdominal pain.  Appetite and activity level have been consistent.    In review of systems:  Negative.    Past Medical History:  No chronic illnesses.  The patient has no history of MRSA.  No history of hemophilia.      No current outpatient medications on file.     No current facility-administered medications for this visit.         Surgeries:  He has had no prior surgeries.    Allergies:  He has no allergies to medications or to latex.    Family History:  Dad reports no family history of hemophilia.  No family history of problems with anesthesia.    Physical exam:  Vitals:    02/18/20 0934   BP: 104/62   Pulse: 96   Temp: 97.9 °F (36.6 °C)      General appearance: Patient is alert, well-nourished in appearance.  Head: Normocephalic. Atraumatic.  Eyes: No scleral injection.  Ears:  Tympanic membranes are pale and shiny.  Throat: No erythema or exudate of posterior pharynx.  Nose:  Nasal mucosa is moist and pink.  Neck: Supple. No adenopathy. No supraclavicular adenopathy.  Lungs: Clear to auscultation bilaterally.  Heart: Regular rate and rhythm.  No murmur.  Abdomen: Soft. Nontender. There are no masses or hepatosplenomegaly.  Skin: No rash.  Extremities: Full range of motion of upper and lower extremities.    Labs:  No labs were performed at today's visit.    Assessment:  Preoperative exam for dental procedure under general anesthesia.     Plan: Raul is cleared for surgery.  Assessed risk level ASA Class I. Family to call if problems and follow up as needed.

## 2025-05-14 NOTE — PATIENT INSTRUCTIONS
and keep a list of the medicines you take.  Where can you learn more?  Go to https://[email or web address].net/patientEd and enter B912 to learn more about \"Weeks 34 to 36 of Your Pregnancy: Care Instructions.\"  Current as of: April 30, 2024  Content Version: 14.4  © 6309-2654 [employer].   Care instructions adapted under license by [employer]. If you have questions about a medical condition or this instruction, always ask your healthcare professional. [employer], [employer], disclaims any warranty or liability for your use of this information.

## 2025-05-14 NOTE — PROGRESS NOTES
Patient was seen 5/14/2025      Please look under media to view full consult and ultrasound report in ViewPoint.         Destiny England MD   Maternal Fetal Medicine

## 2025-05-14 NOTE — PROCEDURES
PATIENT: JOSEPH JORDAN   -  : 1995   -  DOS:2025   -  INTERPRETING PROVIDER:Destiny England,   Indication  ========    Obesity (Pre-pregnancy BMI 40)    Maternal Assessment  =================    Height (ft) 5 ft  Height (in) 6 in    Method  ======    Transabdominal ultrasound examination. View: Sufficient    Pregnancy  =========    James pregnancy. Number of fetuses: 1    Dating  ======    LMP on: 2024  GA by LMP 33 w + 6 d  PERRY by LMP: 2025  Previous Ultrasound on: 2024  Type of prior assessment: GA  GA at prior assessment date 7 w + 4 d  GA by previous U/S 33 w + 3 d  PERRY by previous Ultrasound: 2025  Assigned: based on the LMP, selected on 2024  Assigned GA 33 w + 6 d  Assigned PERRY: 2025    General Evaluation  ==============    Cardiac activity present.  bpm. Fetal movements: visualized. Presentation: Cephalic  Placenta: Placental site: posterior, appropriate distance from the internal os  Umbilical cord: Cord vessels: 3 vessel cord    Fetal Anatomy  ===========    Stomach: normal  Kidneys: normal  Bladder: normal  Wants to know fetal sex: yes    Amniotic Fluid Assessment  =====================    Amount of AF: normal  MVP 7.5 cm. YAJAIRA 23.7 cm. Q1 6.7 cm, Q2 7.5 cm, Q3 4.4 cm, Q4 5.2 cm    Biophysical Profile  ==============    2: Fetal breathing movements  2: Gross body movements  2: Fetal tone  2: Amniotic fluid volume  8/8 Biophysical profile score    Findings  =======    Intrauterine James pregnancy at 33w 6d by clinical dates.  Amniotic fluid: normal.  Placenta is posterior, appropriate distance from the internal os.  Cephalic presentation.  Biophysical profile score is 8/8.    The ultrasound findings as listed above and diagnostic limitations of ultrasound imaging, including inability to exclude all anomalies, have been reviewed with the patient. All  questions and concerns addressed.    Consultation  ==========    NP note 25    JOSEPH

## 2025-05-21 ENCOUNTER — ROUTINE PRENATAL (OUTPATIENT)
Age: 30
End: 2025-05-21

## 2025-05-21 VITALS — DIASTOLIC BLOOD PRESSURE: 67 MMHG | SYSTOLIC BLOOD PRESSURE: 98 MMHG | HEART RATE: 108 BPM

## 2025-05-21 DIAGNOSIS — Z34.90 PREGNANCY, UNSPECIFIED GESTATIONAL AGE: ICD-10-CM

## 2025-05-21 NOTE — PROGRESS NOTES
Provided verbal communication regarding Non-Stress Test (NST), fetal movement counts, labor precautions, and signs and symptoms of pre-eclampsia. Patient stated she has had some left foot swelling with no pain. No redness or swelling noted. MD notified. Patient verbalized understanding of the information provided. All patient questions were answered

## 2025-05-21 NOTE — PROCEDURES
PATIENT: JOSEPH JORDAN   -  : 1995   -  DOS:2025   -  INTERPRETING PROVIDER:Linwood Rich,   Indication  ========    Obesity (Pre-pregnancy BMI 40)    Maternal Assessment  =================    Height (ft) 5 ft  Height (in) 6 in    Method  ======    External Fetal Monitor and transabdominal ultrasound examination. View: Sufficient    Pregnancy  =========    James pregnancy. Number of fetuses: 1    Dating  ======    LMP on: 2024  GA by LMP 34 w + 6 d  PERRY by LMP: 2025  Previous Ultrasound on: 2024  Type of prior assessment: GA  GA at prior assessment date 7 w + 4 d  GA by previous U/S 34 w + 3 d  PERRY by previous Ultrasound: 2025  Assigned: based on the LMP, selected on 2024  Assigned GA 34 w + 6 d  Assigned PERRY: 2025    General Evaluation  ==============    Cardiac activity present.  bpm. Fetal movements: visualized. Presentation: Cephalic  Placenta: Placental site: posterior, appropriate distance from the internal os  Umbilical cord: Cord vessels: 3 vessel cord    Fetal Anatomy  ===========    Stomach: normal  Kidneys: normal  Bladder: normal  Wants to know fetal sex: yes    Amniotic Fluid Assessment  =====================    Amount of AF: normal  MVP 5.8 cm. YAJAIRA 20.4 cm. Q1 5.8 cm, Q2 4.5 cm, Q3 5.5 cm, Q4 4.6 cm    Non Stress Test  =============    NST interpretation: reactive. Test duration 20 min. Baseline  bpm. Baseline variability: moderate. Accelerations: present. Decelerations: absent. Uterine activity:  absent    Findings  =======    Intrauterine James pregnancy at 34w 6d by clinical dates.  Amniotic fluid: normal.  Placenta is posterior, appropriate distance from the internal os.  Cephalic presentation.    NST is reactive. Modified BPP is normal.    The ultrasound findings as listed above and diagnostic limitations of ultrasound imaging, including inability to exclude all anomalies, have been reviewed with the patient.

## 2025-05-21 NOTE — PROGRESS NOTES
Patient was seen 5/21/2025      Please look under media to view full consult and ultrasound report in ViewPoint.         Linwood Rich MD  Maternal Fetal Medicine

## 2025-05-28 ENCOUNTER — ROUTINE PRENATAL (OUTPATIENT)
Age: 30
End: 2025-05-28

## 2025-05-28 VITALS
DIASTOLIC BLOOD PRESSURE: 77 MMHG | SYSTOLIC BLOOD PRESSURE: 120 MMHG | BODY MASS INDEX: 45 KG/M2 | HEART RATE: 80 BPM | WEIGHT: 270.4 LBS

## 2025-05-28 DIAGNOSIS — Z34.80 SUPERVISION OF OTHER NORMAL PREGNANCY, ANTEPARTUM: Primary | ICD-10-CM

## 2025-05-28 DIAGNOSIS — N89.8 VAGINAL DISCHARGE: ICD-10-CM

## 2025-05-28 PROCEDURE — 0502F SUBSEQUENT PRENATAL CARE: CPT | Performed by: OBSTETRICS & GYNECOLOGY

## 2025-05-28 NOTE — PROGRESS NOTES
Patient Active Problem List    Diagnosis Date Noted    Supervision of other normal pregnancy, antepartum 11/14/2024     Overview Note:     EDC 6/26/2025 D=US  NIPTS normal female  Horizon - sickle carrier, FOB negative  Obesity BMI >40  MFM  Baby ASA  Early glucola normal 103  Flu vaccine 11/14/24  Anemia  Measles NONIMMUNE - MMR pp       Was at ER last Saturday, started Tuesday last week, molar pain, pain was unbearable, pt fainted called ambulance to ER.At the ER gave antibiotics for jaw infection, said she was having contractions, pt could not feel them. Referred to dental surgeon. Baby is breach

## 2025-05-28 NOTE — PROGRESS NOTES
Baby moving  Doing well  GBS today  Disc labor precautions  Will post IOL 6/22 bimal, 6/23 IOL per MFM    Cervix closed  Sig vaginal discharge   Nuswab sent

## 2025-05-29 ENCOUNTER — ROUTINE PRENATAL (OUTPATIENT)
Age: 30
End: 2025-05-29

## 2025-05-29 VITALS — HEART RATE: 81 BPM | SYSTOLIC BLOOD PRESSURE: 110 MMHG | DIASTOLIC BLOOD PRESSURE: 74 MMHG

## 2025-05-29 DIAGNOSIS — O36.63X0 EXCESSIVE FETAL GROWTH AFFECTING MANAGEMENT OF PREGNANCY IN THIRD TRIMESTER, SINGLE OR UNSPECIFIED FETUS: ICD-10-CM

## 2025-05-29 DIAGNOSIS — Z34.90 PREGNANCY, UNSPECIFIED GESTATIONAL AGE: ICD-10-CM

## 2025-05-29 DIAGNOSIS — O40.3XX0 POLYHYDRAMNIOS IN THIRD TRIMESTER COMPLICATION, SINGLE OR UNSPECIFIED FETUS: ICD-10-CM

## 2025-05-29 DIAGNOSIS — O36.63X0 EXCESSIVE FETAL GROWTH AFFECTING MANAGEMENT OF PREGNANCY IN THIRD TRIMESTER, SINGLE OR UNSPECIFIED FETUS: Primary | ICD-10-CM

## 2025-05-29 NOTE — PROGRESS NOTES
Patient was seen 5/29/2025      Please look under media to view full consult and ultrasound report in ViewPoint.         Destiny England MD   Maternal Fetal Medicine

## 2025-05-29 NOTE — PROCEDURES
PATIENT: JOSEPH JORDAN   -  : 1995   -  DOS:2025   -  INTERPRETING PROVIDER:Destiny England,   Indication  ========    Obesity (Pre-pregnancy BMI 40)    Method  ======    Transabdominal ultrasound examination. View: Sufficient    Pregnancy  =========    James pregnancy. Number of fetuses: 1    Dating  ======    LMP on: 2024  GA by LMP 36 w + 0 d  PERRY by LMP: 2025  Previous Ultrasound on: 2024  Type of prior assessment: GA  GA at prior assessment date 7 w + 4 d  GA by previous U/S 35 w + 4 d  PERRY by previous Ultrasound: 2025  Ultrasound examination on: 2025  GA by U/S based upon: BPD, Femur, HC  GA by U/S 36 w + 5 d  PERRY by U/S: 2025  Assigned: based on the LMP, selected on 2024  Assigned GA 36 w + 0 d  Assigned PERRY: 2025    Fetal Biometry  ============    Standard  BPD 89.2 mm 36w 1d 61% Hadlock  .3 mm -/- 97% Deja  .5 mm 37w 3d 56% Hadlock  .4 mm -/- >99% Hadlock  Femur 71.5 mm 36w 4d 62% Hadlock  EFW 3,970 g -/- >99% Hadlock  EFW (lb) 8 lb  EFW (oz) 12 oz  EFW by: Hadlock (BPD-HC-AC-FL)  Other Structures   bpm    General Evaluation  ==============    Cardiac activity present.  bpm. Fetal movements: visualized. Presentation: Cephalic  Placenta: Placental site: posterior, appropriate distance from the internal os  Umbilical cord: Cord vessels: 3 vessel cord  Amniotic fluid: Amount of AF: polyhydramnios. MVP 7.2 cm. YAJAIRA 24.8 cm. Q1 6.3 cm, Q2 6.1 cm, Q3 7.2 cm, Q4 5.1 cm    Fetal Anatomy  ===========    Stomach: normal  Kidneys: normal  Bladder: normal  Wants to know fetal sex: yes    Biophysical Profile  ==============    2: Fetal breathing movements  2: Gross body movements  2: Fetal tone  2: Amniotic fluid volume  8/8 Biophysical profile score    Findings  =======    Intrauterine James pregnancy at 36w 0d by clinical dates.  EFW is 3970 g at >99%, abdominal circumference at >99%.  Anatomy visualized as stated

## 2025-05-29 NOTE — PROGRESS NOTES
Recommendations  F/u 1 week to cont weekly ANT      - Recommend serial growth scans Q 3-4 weeks   -  testing weekly   - Delivery: 39.0-39.6 > Cervical ripening  , IOL     Patient images have been reviewed. Agree with the plan of care as outlined above.   Destiny England MD   Maternal Fetal Medicine     Please see Viewpoint for ultrasound findings.      Florecita Mccoy (:  1995) is a 29 y.o. female,Established patient, here for evaluation of the following chief complaint(s):  1. Excessive fetal growth affecting management of pregnancy in third trimester, single or unspecified fetus  2. BMI 40.0-44.9, adult (HCC)  3. Polyhydramnios in third trimester complication, single or unspecified fetus      Objective   Physical Exam  Vitals reviewed.   Constitutional:       Appearance: Normal appearance.   Neurological:      Mental Status: She is alert.   Psychiatric:         Mood and Affect: Mood normal.         Judgment: Judgment normal.       On this date 2025  I have spent time reviewing previous notes, test results and discussing the diagnosis and importance of compliance with the treatment plan face to face with the patient as well as documenting on the day of the visit.  An electronic signature was used to authenticate this note.    --LEANDRA Galindo - CNP

## 2025-05-30 LAB
GP B STREP DNA SPEC QL NAA+PROBE: NEGATIVE
SPECIMEN STATUS REPORT: NORMAL
SPECIMEN STATUS REPORT: NORMAL

## 2025-05-31 LAB
A VAGINAE DNA VAG QL NAA+PROBE: ABNORMAL SCORE
BVAB2 DNA VAG QL NAA+PROBE: ABNORMAL SCORE
C ALBICANS DNA VAG QL NAA+PROBE: POSITIVE
C GLABRATA DNA VAG QL NAA+PROBE: NEGATIVE
MEGA1 DNA VAG QL NAA+PROBE: ABNORMAL SCORE
T VAGINALIS DNA VAG QL NAA+PROBE: NEGATIVE

## 2025-06-05 ENCOUNTER — ROUTINE PRENATAL (OUTPATIENT)
Age: 30
End: 2025-06-05

## 2025-06-05 ENCOUNTER — RESULTS FOLLOW-UP (OUTPATIENT)
Age: 30
End: 2025-06-05

## 2025-06-05 VITALS — SYSTOLIC BLOOD PRESSURE: 123 MMHG | DIASTOLIC BLOOD PRESSURE: 78 MMHG | HEART RATE: 85 BPM

## 2025-06-05 VITALS
WEIGHT: 274 LBS | HEART RATE: 97 BPM | SYSTOLIC BLOOD PRESSURE: 116 MMHG | DIASTOLIC BLOOD PRESSURE: 76 MMHG | BODY MASS INDEX: 45.6 KG/M2

## 2025-06-05 DIAGNOSIS — Z34.80 SUPERVISION OF OTHER NORMAL PREGNANCY, ANTEPARTUM: Primary | ICD-10-CM

## 2025-06-05 DIAGNOSIS — O40.3XX0 POLYHYDRAMNIOS IN THIRD TRIMESTER COMPLICATION, SINGLE OR UNSPECIFIED FETUS: ICD-10-CM

## 2025-06-05 DIAGNOSIS — O36.63X0 EXCESSIVE FETAL GROWTH AFFECTING MANAGEMENT OF PREGNANCY IN THIRD TRIMESTER, SINGLE OR UNSPECIFIED FETUS: Primary | ICD-10-CM

## 2025-06-05 DIAGNOSIS — O99.210 OBESITY AFFECTING PREGNANCY, ANTEPARTUM, UNSPECIFIED OBESITY TYPE: ICD-10-CM

## 2025-06-05 DIAGNOSIS — Z34.90 PREGNANCY, UNSPECIFIED GESTATIONAL AGE: ICD-10-CM

## 2025-06-05 DIAGNOSIS — O36.63X0 EXCESSIVE FETAL GROWTH AFFECTING MANAGEMENT OF PREGNANCY IN THIRD TRIMESTER, SINGLE OR UNSPECIFIED FETUS: ICD-10-CM

## 2025-06-05 DIAGNOSIS — Z3A.37 37 WEEKS GESTATION OF PREGNANCY: ICD-10-CM

## 2025-06-05 DIAGNOSIS — O40.3XX0 POLYHYDRAMNIOS IN THIRD TRIMESTER COMPLICATION, SINGLE OR UNSPECIFIED FETUS: Primary | ICD-10-CM

## 2025-06-05 PROCEDURE — 0502F SUBSEQUENT PRENATAL CARE: CPT | Performed by: OBSTETRICS & GYNECOLOGY

## 2025-06-05 RX ORDER — TERCONAZOLE 8 MG/G
CREAM VAGINAL
Qty: 20 G | Refills: 0 | Status: ON HOLD | OUTPATIENT
Start: 2025-06-05 | End: 2025-06-23

## 2025-06-05 ASSESSMENT — PATIENT HEALTH QUESTIONNAIRE - PHQ9
SUM OF ALL RESPONSES TO PHQ QUESTIONS 1-9: 0
2. FEELING DOWN, DEPRESSED OR HOPELESS: NOT AT ALL
1. LITTLE INTEREST OR PLEASURE IN DOING THINGS: NOT AT ALL

## 2025-06-05 NOTE — PROGRESS NOTES
Seen at [facility]. Baby already 4000g. [facility] disc with patient risk/benefits CS vs attempt at . She is currently scheduled for IOL .   Patient states she was traumatized by her last delivery. She was in pain - difficult epidural placement. Had very long labor and she does not want to go through that again   She had questions today about risk of  that were discussed at [facility]. She is tested to 8#4oz. I explained US is not exact and the baby could be smaller than we think. After consideration of the risks of both procedures which we disc in detail she desires a primary CS. Disc increased risk of bleeding, infection, injury, recovery time following CS.     Posted  at 730 with Joyce by TH    Baby moving  GBS neg  Cervix closed, head is out of pelvis

## 2025-06-05 NOTE — CONSULTS
Session ID: [id number]  Session Duration: Longer than 53 minutes  Language: Latvian   ID: #[id number]   Name: Cedric: Latvian   ID: #[id number]   Name: Leatha

## 2025-06-05 NOTE — PROGRESS NOTES
Assessment & Plan   ASSESSMENT/PLAN:  1. Excessive fetal growth affecting management of pregnancy in third trimester, single or unspecified fetus  2. Obesity affecting pregnancy, antepartum, unspecified obesity type  3. Polyhydramnios in third trimester complication, single or unspecified fetus    JOSEPH is 29 yrs of age,  at 37w 0d.       utilized: Leatha # [id number]     Reactive NST, mild polyhydramnios today.      LGA  - : 3970 grams (>99%tile) and AC >99%%tile  - 24: 1 hr gtt pass 103; 3/27/25: 1hr gtt 107 passed  - A large fetus may affect delivery management. Since her glucose screening was normal, this is likely familial. We discussed that the risks associated with a large fetus included but are not limited to: shoulder dystocia (increased risk of brachial plexus injury), perineal trauma (vagina, rectum, bladder), uterine atony with post-partum hemorrhage, operative vaginal delivery (not recommended), and/or labor dystocia requiring a  delivery. A  can be considered but ACOG allows attempts at vaginal delivery up to 5,000 grams in the absence of GDM or PGDM. The probability of shoulder dystocia is increased for large fetuses and a brachial plexus injury may occur in some of these cases. Fortunately, only 7% of such injuries are permanent. Any decision to perform an elective  should be discussed with you. We also note that estimates of fetal weight are plus or minus 10%.   - ANT and Growth recommendations per other indications.      Maternal Obesity (Pre-Pregnancy BMI 40.44):   - 24: 1 hr gtt pass 103  - 3/27/25: 1hr gtt 107 passed   - Delivery: 39.0-39.6   - Kick count instructions, PIH and PTL precautions reviewed.      Mild polyhydramnios   - NIPT low risk   - 24: 1 hr gtt pass 103  - 3/27/25: 1hr gtt 107 passed      SCD  - FOB negative      NIPT: normal female (david)  msAFP neg     Recommendations  F/u 1 week to cont weekly ANT

## 2025-06-05 NOTE — PROGRESS NOTES
Patient was seen 6/5/2025      Please look under media to view full consult and ultrasound report in ViewPoint.         Destiny England MD   Maternal Fetal Medicine

## 2025-06-05 NOTE — PROCEDURES
PATIENT: JOSEPH JORDAN   -  : 1995   -  DOS:2025   -  INTERPRETING PROVIDER:Destiny England,   Indication  ========    Obesity (Pre-pregnancy BMI 40)    Maternal Assessment  =================    Height (ft) 5 ft  Height (in) 6 in    Method  ======    External Fetal Monitor and transabdominal ultrasound examination. View: suboptimal due to maternal acoustic properties    Pregnancy  =========    James pregnancy. Number of fetuses: 1    Dating  ======    LMP on: 2024  GA by LMP 37 w + 0 d  PERRY by LMP: 2025  Previous Ultrasound on: 2024  Type of prior assessment: GA  GA at prior assessment date 7 w + 4 d  GA by previous U/S 36 w + 4 d  PERRY by previous Ultrasound: 2025  Assigned: based on the LMP, selected on 2024  Assigned GA 37 w + 0 d  Assigned PERRY: 2025    General Evaluation  ==============    Cardiac activity present.  bpm. Fetal movements: visualized. Presentation: Cephalic  Placenta: Placental site: posterior, appropriate distance from the internal os  Umbilical cord: Cord vessels: 3 vessel cord    Fetal Anatomy  ===========    Stomach: normal  Kidneys: normal  Bladder: normal  Wants to know fetal sex: yes    Amniotic Fluid Assessment  =====================    Amount of AF: polyhydramnios  MVP 9.2 cm. YAJAIRA 25.4 cm. Q1 9.2 cm, Q2 7.1 cm, Q3 3.0 cm, Q4 6.1 cm    Non Stress Test  =============    NST interpretation: reactive. Test duration 20 min. Baseline  bpm. Baseline variability: moderate. Accelerations: present. Decelerations: absent. Uterine activity:  absent    Findings  =======    Intrauterine James pregnancy at 37w 0d by clinical dates.  Amniotic fluid: polyhydramnios.  Placenta is posterior, appropriate distance from the internal os.  Cephalic presentation.    NST is reactive. Modified BPP is normal.    The ultrasound findings as listed above and diagnostic limitations of ultrasound imaging, including inability to exclude all

## 2025-06-11 ENCOUNTER — ROUTINE PRENATAL (OUTPATIENT)
Age: 30
End: 2025-06-11

## 2025-06-11 VITALS
DIASTOLIC BLOOD PRESSURE: 78 MMHG | HEART RATE: 100 BPM | SYSTOLIC BLOOD PRESSURE: 113 MMHG | BODY MASS INDEX: 46.2 KG/M2 | WEIGHT: 277.6 LBS

## 2025-06-11 DIAGNOSIS — Z3A.37 37 WEEKS GESTATION OF PREGNANCY: ICD-10-CM

## 2025-06-11 DIAGNOSIS — O99.210 OBESITY AFFECTING PREGNANCY, ANTEPARTUM, UNSPECIFIED OBESITY TYPE: ICD-10-CM

## 2025-06-11 DIAGNOSIS — Z34.80 SUPERVISION OF OTHER NORMAL PREGNANCY, ANTEPARTUM: Primary | ICD-10-CM

## 2025-06-11 PROCEDURE — 0502F SUBSEQUENT PRENATAL CARE: CPT | Performed by: OBSTETRICS & GYNECOLOGY

## 2025-06-11 NOTE — CONSULTS
Session ID: [id number]  Session Duration: Longer than 52 minutes  Language: Nepali   ID: #[id number]   Name: Cornel: Nepali   ID: #[id number]   Name: Marine

## 2025-06-11 NOTE — PROGRESS NOTES
Patient Active Problem List    Diagnosis Date Noted    Supervision of other normal pregnancy, antepartum 11/14/2024     Overview Note:     EDC 6/26/2025 D=US  NIPTS normal female  Horizon - sickle carrier, FOB negative  Obesity BMI >40  MFM  Baby ASA  Early glucola normal 103  Flu vaccine 11/14/24  Anemia  Measles NONIMMUNE - MMR pp   37 weeks: LGA growth, BPP 8/8 mild polyhydramnios   3970 grams (>99%tile) and AC >99%%tile  Cook 6/22, IOL 6/23

## 2025-06-12 ENCOUNTER — TELEMEDICINE (OUTPATIENT)
Age: 30
End: 2025-06-12

## 2025-06-12 DIAGNOSIS — Z34.90 PREGNANCY, UNSPECIFIED GESTATIONAL AGE: ICD-10-CM

## 2025-06-12 DIAGNOSIS — O40.9XX0 POLYHYDRAMNIOS AFFECTING PREGNANCY: Primary | ICD-10-CM

## 2025-06-12 NOTE — PROCEDURES
PATIENT: JOSEPH JORDAN   -  : 1995   -  DOS:2025   -  INTERPRETING PROVIDER:Destiny England,   Indication  ========    Obesity (Pre-pregnancy BMI 40)    Maternal Assessment  =================    Height (ft) 5 ft  Height (in) 6 in    Method  ======    Transabdominal ultrasound examination. View: Good view    Pregnancy  =========    James pregnancy. Number of fetuses: 1    Dating  ======    LMP on: 2024  GA by LMP 38 w + 0 d  PERRY by LMP: 2025  Previous Ultrasound on: 2024  Type of prior assessment: GA  GA at prior assessment date 7 w + 4 d  GA by previous U/S 37 w + 4 d  PERRY by previous Ultrasound: 2025  Assigned: based on the LMP, selected on 2024  Assigned GA 38 w + 0 d  Assigned PERRY: 2025    General Evaluation  ==============    Cardiac activity present.  bpm. Fetal movements: visualized. Presentation: Cephalic  Placenta: Placental site: posterior, appropriate distance from the internal os  Umbilical cord: Cord vessels: 3 vessel cord    Fetal Anatomy  ===========    Stomach: normal  Kidneys: normal  Bladder: normal  Wants to know fetal sex: yes    Amniotic Fluid Assessment  =====================    Amount of AF: polyhydramnios  MVP 6.8 cm. YAJAIRA 24.2 cm. Q1 5.9 cm, Q2 6.8 cm, Q3 6.1 cm, Q4 5.4 cm    Biophysical Profile  ==============    2: Fetal breathing movements  2: Gross body movements  2: Fetal tone  2: Amniotic fluid volume  8/8 Biophysical profile score    Findings  =======    Intrauterine James pregnancy at 38w 0d by clinical dates.  Amniotic fluid: polyhydramnios.  Placenta is posterior, appropriate distance from the internal os.  Cephalic presentation.  Biophysical profile score is 8/8.      The ultrasound findings as listed above and diagnostic limitations of ultrasound imaging, including inability to exclude all anomalies, have been reviewed with the patient. All  questions and concerns addressed.    Consultation  ==========    JOSEPH

## 2025-06-12 NOTE — PROGRESS NOTES
Patient was seen 6/12/2025      Please look under media to view full consult and ultrasound report in ViewPoint.         Destiny England MD   Maternal Fetal Medicine

## 2025-06-12 NOTE — CONSULTS
Session ID: [id number]  Session Duration: Longer than 51 minutes  Language: Thai   ID: #[id number]   Name: Bruno

## 2025-06-16 ENCOUNTER — ROUTINE PRENATAL (OUTPATIENT)
Age: 30
End: 2025-06-16

## 2025-06-16 VITALS
SYSTOLIC BLOOD PRESSURE: 130 MMHG | WEIGHT: 277.6 LBS | HEART RATE: 98 BPM | DIASTOLIC BLOOD PRESSURE: 85 MMHG | BODY MASS INDEX: 46.2 KG/M2

## 2025-06-16 DIAGNOSIS — Z34.80 SUPERVISION OF OTHER NORMAL PREGNANCY, ANTEPARTUM: Primary | ICD-10-CM

## 2025-06-16 PROCEDURE — 0502F SUBSEQUENT PRENATAL CARE: CPT | Performed by: OBSTETRICS & GYNECOLOGY

## 2025-06-16 NOTE — PROGRESS NOTES
Patient presents complaining of lower leg edema.  She thought 1 leg was more swollen than the other.  There is no pain.  Today both legs appear to have moderate edema.  Neither leg is tender.  Blood pressure is normal.  She has a follow-up appointment in several days and she will keep that appointment.

## 2025-06-19 ENCOUNTER — ROUTINE PRENATAL (OUTPATIENT)
Age: 30
End: 2025-06-19

## 2025-06-19 VITALS — BODY MASS INDEX: 46.2 KG/M2 | SYSTOLIC BLOOD PRESSURE: 114 MMHG | DIASTOLIC BLOOD PRESSURE: 74 MMHG | WEIGHT: 277.6 LBS

## 2025-06-19 VITALS — SYSTOLIC BLOOD PRESSURE: 133 MMHG | OXYGEN SATURATION: 97 % | DIASTOLIC BLOOD PRESSURE: 84 MMHG | HEART RATE: 103 BPM

## 2025-06-19 DIAGNOSIS — Z3A.39 39 WEEKS GESTATION OF PREGNANCY: ICD-10-CM

## 2025-06-19 DIAGNOSIS — Z34.90 PREGNANCY, UNSPECIFIED GESTATIONAL AGE: ICD-10-CM

## 2025-06-19 DIAGNOSIS — Z34.80 SUPERVISION OF OTHER NORMAL PREGNANCY, ANTEPARTUM: Primary | ICD-10-CM

## 2025-06-19 DIAGNOSIS — O99.210 OBESITY AFFECTING PREGNANCY, ANTEPARTUM, UNSPECIFIED OBESITY TYPE: ICD-10-CM

## 2025-06-19 PROCEDURE — 0502F SUBSEQUENT PRENATAL CARE: CPT | Performed by: OBSTETRICS & GYNECOLOGY

## 2025-06-19 NOTE — CONSULTS
Session ID: [id number]  Session Duration: Longer than 53 minutes  Language: Azeri   ID: #[id number]   Name: Kelley

## 2025-06-19 NOTE — CONSULTS
Session ID: [id number]  Session Duration: Longer than 54 minutes  Language: Croatian   ID: #[id number]   Name: Omega

## 2025-06-19 NOTE — PROCEDURES
PATIENT: JOSEPH JORDAN   -  : 1995   -  DOS:2025   -  INTERPRETING PROVIDER:Linwood Rich,   Indication  ========    Obesity (Pre-pregnancy BMI 40)    Method  ======    Transabdominal ultrasound examination. View: Sufficient    Pregnancy  =========    James pregnancy. Number of fetuses: 1    Dating  ======    LMP on: 2024  GA by LMP 39 w + 0 d  PERRY by LMP: 2025  Previous Ultrasound on: 2024  Type of prior assessment: GA  GA at prior assessment date 7 w + 4 d  GA by previous U/S 38 w + 4 d  PERRY by previous Ultrasound: 2025  Ultrasound examination on: 2025  GA by U/S based upon: BPD, Femur, HC  GA by U/S 38 w + 2 d  PERRY by U/S: 2025  Assigned: based on the LMP, selected on 2024  Assigned GA 39 w + 0 d  Assigned PERRY: 2025    Fetal Biometry  ============    Standard  BPD 91.1 mm 37w 0d 27% Hadlock  .9 mm 39w 0d 49% Deja  .0 mm 37w 4d 11% Hadlock  .4 mm -/- >99% Hadlock  Femur 78.5 mm 40w 1d 82% Hadlock  EFW 4,775 g -/- >99% Hadlock  EFW (lb) 10 lb  EFW (oz) 8 oz  EFW by: Hadlock (BPD-HC-AC-FL)  Other Structures   bpm    General Evaluation  ==============    Cardiac activity present.  bpm. Fetal movements: visualized. Presentation: Cephalic  Placenta: Placental site: posterior, appropriate distance from the internal os  Umbilical cord: Cord vessels: 3 vessel cord  Amniotic fluid: Amount of AF: normal. MVP 7.0 cm. YAJAIRA 16.2 cm. Q1 4.4 cm, Q2 7.0 cm, Q3 3.4 cm, Q4 1.4 cm    Fetal Anatomy  ===========    Stomach: normal  Kidneys: normal  Bladder: normal  Wants to know fetal sex: yes    Biophysical Profile  ==============    2: Fetal breathing movements  2: Gross body movements  2: Fetal tone  2: Amniotic fluid volume  8/8 Biophysical profile score    Findings  =======    Intrauterine James pregnancy at 39w 0d by clinical dates.  EFW is 4775 g at >99%, abdominal circumference at >99%.  Anatomy visualized as stated

## 2025-06-19 NOTE — PROGRESS NOTES
Patient was seen 6/19/2025      Please look under media to view full consult and ultrasound report in ViewPoint.         Linwood Rich MD  Maternal Fetal Medicine

## 2025-06-19 NOTE — PROGRESS NOTES
Chief Complaint   Patient presents with    Pregnancy Ultrasound           Vitals:    06/19/25 1006   BP: 133/84   Pulse: (!) 103   SpO2: 97%            1. Have you been to the ER, urgent care clinic since your last visit?  Hospitalized since your last visit?  No    2. Have you seen or consulted any other health care providers outside of the [facility] since your last visit?  Include any pap smears or colon screening. No

## 2025-06-23 ENCOUNTER — ANESTHESIA EVENT (OUTPATIENT)
Facility: HOSPITAL | Age: 30
DRG: 540 | End: 2025-06-23
Payer: MEDICAID

## 2025-06-23 ENCOUNTER — ANESTHESIA (OUTPATIENT)
Facility: HOSPITAL | Age: 30
DRG: 540 | End: 2025-06-23
Payer: MEDICAID

## 2025-06-23 ENCOUNTER — HOSPITAL ENCOUNTER (INPATIENT)
Facility: HOSPITAL | Age: 30
LOS: 2 days | Discharge: HOME | DRG: 540 | End: 2025-06-25
Attending: OBSTETRICS & GYNECOLOGY | Admitting: OBSTETRICS & GYNECOLOGY
Payer: MEDICAID

## 2025-06-23 DIAGNOSIS — Z3A.39 39 WEEKS GESTATION OF PREGNANCY: ICD-10-CM

## 2025-06-23 DIAGNOSIS — G89.18 POST-OP PAIN: ICD-10-CM

## 2025-06-23 DIAGNOSIS — Z34.80 SUPERVISION OF OTHER NORMAL PREGNANCY, ANTEPARTUM: ICD-10-CM

## 2025-06-23 LAB
ABO + RH BLD: NORMAL
BLOOD GROUP ANTIBODIES SERPL: NORMAL
ERYTHROCYTE [DISTWIDTH] IN BLOOD BY AUTOMATED COUNT: 15.1 % (ref 11.5–14.5)
HCT VFR BLD AUTO: 32.8 % (ref 35–47)
HGB BLD-MCNC: 11.1 G/DL (ref 11.5–16)
MCH RBC QN AUTO: 28 PG (ref 26–34)
MCHC RBC AUTO-ENTMCNC: 33.8 G/DL (ref 30–36.5)
MCV RBC AUTO: 82.6 FL (ref 80–99)
NRBC # BLD: 0 K/UL (ref 0–0.01)
NRBC BLD-RTO: 0 PER 100 WBC
PLATELET # BLD AUTO: 174 K/UL (ref 150–400)
PMV BLD AUTO: 11.2 FL (ref 8.9–12.9)
RBC # BLD AUTO: 3.97 M/UL (ref 3.8–5.2)
SPECIMEN EXP DATE BLD: NORMAL
WBC # BLD AUTO: 9.7 K/UL (ref 3.6–11)

## 2025-06-23 PROCEDURE — 6370000000 HC RX 637 (ALT 250 FOR IP): Performed by: OBSTETRICS & GYNECOLOGY

## 2025-06-23 PROCEDURE — 1120000000 HC RM PRIVATE OB

## 2025-06-23 PROCEDURE — 3700000000 HC ANESTHESIA ATTENDED CARE: Performed by: OBSTETRICS & GYNECOLOGY

## 2025-06-23 PROCEDURE — 4A1HXCZ MONITORING OF PRODUCTS OF CONCEPTION, CARDIAC RATE, EXTERNAL APPROACH: ICD-10-PCS | Performed by: OBSTETRICS & GYNECOLOGY

## 2025-06-23 PROCEDURE — 6360000002 HC RX W HCPCS: Performed by: OBSTETRICS & GYNECOLOGY

## 2025-06-23 PROCEDURE — 6360000002 HC RX W HCPCS: Performed by: NURSE ANESTHETIST, CERTIFIED REGISTERED

## 2025-06-23 PROCEDURE — 86901 BLOOD TYPING SEROLOGIC RH(D): CPT

## 2025-06-23 PROCEDURE — 7100000000 HC PACU RECOVERY - FIRST 15 MIN: Performed by: OBSTETRICS & GYNECOLOGY

## 2025-06-23 PROCEDURE — 86900 BLOOD TYPING SEROLOGIC ABO: CPT

## 2025-06-23 PROCEDURE — 86780 TREPONEMA PALLIDUM: CPT

## 2025-06-23 PROCEDURE — 2580000003 HC RX 258: Performed by: OBSTETRICS & GYNECOLOGY

## 2025-06-23 PROCEDURE — 86850 RBC ANTIBODY SCREEN: CPT

## 2025-06-23 PROCEDURE — 99465 NB RESUSCITATION: CPT

## 2025-06-23 PROCEDURE — 2720000010 HC SURG SUPPLY STERILE: Performed by: OBSTETRICS & GYNECOLOGY

## 2025-06-23 PROCEDURE — 94761 N-INVAS EAR/PLS OXIMETRY MLT: CPT

## 2025-06-23 PROCEDURE — 85027 COMPLETE CBC AUTOMATED: CPT

## 2025-06-23 PROCEDURE — 3700000001 HC ADD 15 MINUTES (ANESTHESIA): Performed by: OBSTETRICS & GYNECOLOGY

## 2025-06-23 PROCEDURE — 59510 CESAREAN DELIVERY: CPT | Performed by: OBSTETRICS & GYNECOLOGY

## 2025-06-23 PROCEDURE — 3609079900 HC CESAREAN SECTION: Performed by: OBSTETRICS & GYNECOLOGY

## 2025-06-23 PROCEDURE — 2709999900 HC NON-CHARGEABLE SUPPLY: Performed by: OBSTETRICS & GYNECOLOGY

## 2025-06-23 PROCEDURE — 2500000003 HC RX 250 WO HCPCS: Performed by: OBSTETRICS & GYNECOLOGY

## 2025-06-23 PROCEDURE — 2580000003 HC RX 258: Performed by: NURSE ANESTHETIST, CERTIFIED REGISTERED

## 2025-06-23 PROCEDURE — 36415 COLL VENOUS BLD VENIPUNCTURE: CPT

## 2025-06-23 RX ORDER — METHYLERGONOVINE MALEATE 0.2 MG/ML
200 INJECTION INTRAVENOUS PRN
Status: DISCONTINUED | OUTPATIENT
Start: 2025-06-23 | End: 2025-06-25 | Stop reason: HOSPADM

## 2025-06-23 RX ORDER — SENNA AND DOCUSATE SODIUM 50; 8.6 MG/1; MG/1
1 TABLET, FILM COATED ORAL DAILY
Status: DISCONTINUED | OUTPATIENT
Start: 2025-06-23 | End: 2025-06-25 | Stop reason: HOSPADM

## 2025-06-23 RX ORDER — TRANEXAMIC ACID 10 MG/ML
1000 INJECTION, SOLUTION INTRAVENOUS
Status: COMPLETED | OUTPATIENT
Start: 2025-06-23 | End: 2025-06-23

## 2025-06-23 RX ORDER — SODIUM CHLORIDE, SODIUM LACTATE, POTASSIUM CHLORIDE, CALCIUM CHLORIDE 600; 310; 30; 20 MG/100ML; MG/100ML; MG/100ML; MG/100ML
INJECTION, SOLUTION INTRAVENOUS CONTINUOUS
Status: DISCONTINUED | OUTPATIENT
Start: 2025-06-23 | End: 2025-06-23

## 2025-06-23 RX ORDER — PHENYLEPHRINE HCL IN 0.9% NACL 0.4MG/10ML
SYRINGE (ML) INTRAVENOUS
Status: DISCONTINUED | OUTPATIENT
Start: 2025-06-23 | End: 2025-06-23 | Stop reason: SDUPTHER

## 2025-06-23 RX ORDER — KETOROLAC TROMETHAMINE 30 MG/ML
30 INJECTION, SOLUTION INTRAMUSCULAR; INTRAVENOUS EVERY 6 HOURS
Status: DISCONTINUED | OUTPATIENT
Start: 2025-06-23 | End: 2025-06-24

## 2025-06-23 RX ORDER — SODIUM CHLORIDE, SODIUM LACTATE, POTASSIUM CHLORIDE, CALCIUM CHLORIDE 600; 310; 30; 20 MG/100ML; MG/100ML; MG/100ML; MG/100ML
INJECTION, SOLUTION INTRAVENOUS CONTINUOUS
Status: DISCONTINUED | OUTPATIENT
Start: 2025-06-23 | End: 2025-06-25 | Stop reason: HOSPADM

## 2025-06-23 RX ORDER — ACETAMINOPHEN 500 MG
1000 TABLET ORAL EVERY 8 HOURS SCHEDULED
Status: DISCONTINUED | OUTPATIENT
Start: 2025-06-23 | End: 2025-06-25 | Stop reason: HOSPADM

## 2025-06-23 RX ORDER — KETOROLAC TROMETHAMINE 30 MG/ML
INJECTION, SOLUTION INTRAMUSCULAR; INTRAVENOUS
Status: DISCONTINUED | OUTPATIENT
Start: 2025-06-23 | End: 2025-06-23 | Stop reason: SDUPTHER

## 2025-06-23 RX ORDER — ONDANSETRON 4 MG/1
4 TABLET, ORALLY DISINTEGRATING ORAL EVERY 8 HOURS PRN
Status: DISCONTINUED | OUTPATIENT
Start: 2025-06-23 | End: 2025-06-25 | Stop reason: HOSPADM

## 2025-06-23 RX ORDER — MORPHINE SULFATE 1 MG/ML
INJECTION, SOLUTION EPIDURAL; INTRATHECAL; INTRAVENOUS
Status: DISCONTINUED | OUTPATIENT
Start: 2025-06-23 | End: 2025-06-23 | Stop reason: SDUPTHER

## 2025-06-23 RX ORDER — ENOXAPARIN SODIUM 100 MG/ML
30 INJECTION SUBCUTANEOUS 2 TIMES DAILY
Status: DISCONTINUED | OUTPATIENT
Start: 2025-06-24 | End: 2025-06-25 | Stop reason: HOSPADM

## 2025-06-23 RX ORDER — OXYCODONE HYDROCHLORIDE 5 MG/1
5 TABLET ORAL EVERY 4 HOURS PRN
Status: DISCONTINUED | OUTPATIENT
Start: 2025-06-23 | End: 2025-06-25 | Stop reason: HOSPADM

## 2025-06-23 RX ORDER — SODIUM CHLORIDE, SODIUM LACTATE, POTASSIUM CHLORIDE, AND CALCIUM CHLORIDE .6; .31; .03; .02 G/100ML; G/100ML; G/100ML; G/100ML
1000 INJECTION, SOLUTION INTRAVENOUS ONCE
Status: COMPLETED | OUTPATIENT
Start: 2025-06-23 | End: 2025-06-23

## 2025-06-23 RX ORDER — DIPHENHYDRAMINE HYDROCHLORIDE 50 MG/ML
25 INJECTION, SOLUTION INTRAMUSCULAR; INTRAVENOUS EVERY 6 HOURS PRN
Status: DISCONTINUED | OUTPATIENT
Start: 2025-06-23 | End: 2025-06-25 | Stop reason: HOSPADM

## 2025-06-23 RX ORDER — MISOPROSTOL 200 UG/1
400 TABLET ORAL
Status: COMPLETED | OUTPATIENT
Start: 2025-06-23 | End: 2025-06-23

## 2025-06-23 RX ORDER — SIMETHICONE 80 MG
80 TABLET,CHEWABLE ORAL EVERY 6 HOURS PRN
Status: DISCONTINUED | OUTPATIENT
Start: 2025-06-23 | End: 2025-06-25 | Stop reason: HOSPADM

## 2025-06-23 RX ORDER — OXYCODONE HYDROCHLORIDE 5 MG/1
10 TABLET ORAL EVERY 4 HOURS PRN
Status: DISCONTINUED | OUTPATIENT
Start: 2025-06-23 | End: 2025-06-25 | Stop reason: HOSPADM

## 2025-06-23 RX ORDER — SWAB
1 SWAB, NON-MEDICATED MISCELLANEOUS DAILY
Status: DISCONTINUED | OUTPATIENT
Start: 2025-06-23 | End: 2025-06-25 | Stop reason: HOSPADM

## 2025-06-23 RX ORDER — ONDANSETRON 2 MG/ML
INJECTION INTRAMUSCULAR; INTRAVENOUS
Status: DISCONTINUED | OUTPATIENT
Start: 2025-06-23 | End: 2025-06-23 | Stop reason: SDUPTHER

## 2025-06-23 RX ORDER — SODIUM CHLORIDE 0.9 % (FLUSH) 0.9 %
5-40 SYRINGE (ML) INJECTION EVERY 12 HOURS SCHEDULED
Status: DISCONTINUED | OUTPATIENT
Start: 2025-06-23 | End: 2025-06-24

## 2025-06-23 RX ORDER — DIPHENHYDRAMINE HCL 25 MG
25 CAPSULE ORAL EVERY 6 HOURS PRN
Status: DISCONTINUED | OUTPATIENT
Start: 2025-06-23 | End: 2025-06-25 | Stop reason: HOSPADM

## 2025-06-23 RX ORDER — SODIUM CHLORIDE 0.9 % (FLUSH) 0.9 %
10 SYRINGE (ML) INJECTION EVERY 12 HOURS SCHEDULED
Status: DISCONTINUED | OUTPATIENT
Start: 2025-06-23 | End: 2025-06-23

## 2025-06-23 RX ORDER — SODIUM CHLORIDE 0.9 % (FLUSH) 0.9 %
5-40 SYRINGE (ML) INJECTION PRN
Status: DISCONTINUED | OUTPATIENT
Start: 2025-06-23 | End: 2025-06-25 | Stop reason: HOSPADM

## 2025-06-23 RX ORDER — IBUPROFEN 800 MG/1
800 TABLET, FILM COATED ORAL EVERY 8 HOURS
Status: DISCONTINUED | OUTPATIENT
Start: 2025-06-24 | End: 2025-06-24

## 2025-06-23 RX ORDER — MISOPROSTOL 200 UG/1
800 TABLET ORAL PRN
Status: DISCONTINUED | OUTPATIENT
Start: 2025-06-23 | End: 2025-06-25 | Stop reason: HOSPADM

## 2025-06-23 RX ORDER — FAMOTIDINE 10 MG/ML
INJECTION, SOLUTION INTRAVENOUS
Status: DISCONTINUED | OUTPATIENT
Start: 2025-06-23 | End: 2025-06-23 | Stop reason: SDUPTHER

## 2025-06-23 RX ORDER — SODIUM CHLORIDE, SODIUM LACTATE, POTASSIUM CHLORIDE, CALCIUM CHLORIDE 600; 310; 30; 20 MG/100ML; MG/100ML; MG/100ML; MG/100ML
INJECTION, SOLUTION INTRAVENOUS
Status: DISCONTINUED | OUTPATIENT
Start: 2025-06-23 | End: 2025-06-23 | Stop reason: SDUPTHER

## 2025-06-23 RX ORDER — DEXAMETHASONE SODIUM PHOSPHATE 4 MG/ML
INJECTION, SOLUTION INTRA-ARTICULAR; INTRALESIONAL; INTRAMUSCULAR; INTRAVENOUS; SOFT TISSUE
Status: DISCONTINUED | OUTPATIENT
Start: 2025-06-23 | End: 2025-06-23 | Stop reason: SDUPTHER

## 2025-06-23 RX ORDER — SODIUM CHLORIDE 9 MG/ML
INJECTION, SOLUTION INTRAVENOUS PRN
Status: DISCONTINUED | OUTPATIENT
Start: 2025-06-23 | End: 2025-06-23

## 2025-06-23 RX ORDER — BUPIVACAINE HYDROCHLORIDE 7.5 MG/ML
INJECTION, SOLUTION INTRASPINAL
Status: DISCONTINUED | OUTPATIENT
Start: 2025-06-23 | End: 2025-06-23 | Stop reason: SDUPTHER

## 2025-06-23 RX ORDER — FAMOTIDINE 20 MG/1
20 TABLET, FILM COATED ORAL 2 TIMES DAILY PRN
Status: DISCONTINUED | OUTPATIENT
Start: 2025-06-23 | End: 2025-06-25 | Stop reason: HOSPADM

## 2025-06-23 RX ORDER — ONDANSETRON 2 MG/ML
4 INJECTION INTRAMUSCULAR; INTRAVENOUS EVERY 6 HOURS PRN
Status: DISCONTINUED | OUTPATIENT
Start: 2025-06-23 | End: 2025-06-25 | Stop reason: HOSPADM

## 2025-06-23 RX ORDER — ONDANSETRON 2 MG/ML
4 INJECTION INTRAMUSCULAR; INTRAVENOUS EVERY 6 HOURS PRN
Status: DISCONTINUED | OUTPATIENT
Start: 2025-06-23 | End: 2025-06-23

## 2025-06-23 RX ORDER — SODIUM CHLORIDE 9 MG/ML
INJECTION, SOLUTION INTRAVENOUS PRN
Status: DISCONTINUED | OUTPATIENT
Start: 2025-06-23 | End: 2025-06-25 | Stop reason: HOSPADM

## 2025-06-23 RX ORDER — SODIUM CHLORIDE 0.9 % (FLUSH) 0.9 %
10 SYRINGE (ML) INJECTION PRN
Status: DISCONTINUED | OUTPATIENT
Start: 2025-06-23 | End: 2025-06-23

## 2025-06-23 RX ADMIN — SODIUM CHLORIDE, POTASSIUM CHLORIDE, SODIUM LACTATE AND CALCIUM CHLORIDE: 600; 310; 30; 20 INJECTION, SOLUTION INTRAVENOUS at 09:35

## 2025-06-23 RX ADMIN — DEXAMETHASONE SODIUM PHOSPHATE 4 MG: 4 INJECTION, SOLUTION INTRAMUSCULAR; INTRAVENOUS at 08:49

## 2025-06-23 RX ADMIN — FAMOTIDINE 20 MG: 10 INJECTION, SOLUTION INTRAVENOUS at 08:49

## 2025-06-23 RX ADMIN — BUPIVACAINE HYDROCHLORIDE IN DEXTROSE 1.6 ML: 7.5 INJECTION, SOLUTION SUBARACHNOID at 08:46

## 2025-06-23 RX ADMIN — ONDANSETRON 4 MG: 2 INJECTION, SOLUTION INTRAMUSCULAR; INTRAVENOUS at 08:49

## 2025-06-23 RX ADMIN — OXYTOCIN 250 MILLI-UNITS/MIN: 10 INJECTION, SOLUTION INTRAMUSCULAR; INTRAVENOUS at 10:31

## 2025-06-23 RX ADMIN — MISOPROSTOL 400 MCG: 200 TABLET ORAL at 12:07

## 2025-06-23 RX ADMIN — KETOROLAC TROMETHAMINE 30 MG: 30 INJECTION, SOLUTION INTRAMUSCULAR at 20:54

## 2025-06-23 RX ADMIN — SODIUM CHLORIDE, POTASSIUM CHLORIDE, SODIUM LACTATE AND CALCIUM CHLORIDE: 600; 310; 30; 20 INJECTION, SOLUTION INTRAVENOUS at 06:09

## 2025-06-23 RX ADMIN — KETOROLAC TROMETHAMINE 30 MG: 30 INJECTION, SOLUTION INTRAMUSCULAR at 15:27

## 2025-06-23 RX ADMIN — MORPHINE SULFATE 0.2 MG: 1 INJECTION, SOLUTION EPIDURAL; INTRATHECAL; INTRAVENOUS at 08:46

## 2025-06-23 RX ADMIN — ACETAMINOPHEN 1000 MG: 500 TABLET ORAL at 12:44

## 2025-06-23 RX ADMIN — ACETAMINOPHEN 1000 MG: 500 TABLET ORAL at 20:53

## 2025-06-23 RX ADMIN — SODIUM CHLORIDE, SODIUM LACTATE, POTASSIUM CHLORIDE, AND CALCIUM CHLORIDE 1000 ML: .6; .31; .03; .02 INJECTION, SOLUTION INTRAVENOUS at 07:07

## 2025-06-23 RX ADMIN — TRANEXAMIC ACID 1000 MG: 10 INJECTION, SOLUTION INTRAVENOUS at 11:13

## 2025-06-23 RX ADMIN — METHYLERGONOVINE MALEATE 200 MCG: 0.2 INJECTION, SOLUTION INTRAMUSCULAR; INTRAVENOUS at 11:14

## 2025-06-23 RX ADMIN — Medication 120 MCG: at 08:52

## 2025-06-23 RX ADMIN — CEFAZOLIN 3000 MG: 3 INJECTION, POWDER, FOR SOLUTION INTRAVENOUS at 08:50

## 2025-06-23 RX ADMIN — KETOROLAC TROMETHAMINE 30 MG: 30 INJECTION, SOLUTION INTRAMUSCULAR at 09:48

## 2025-06-23 RX ADMIN — SODIUM CHLORIDE, POTASSIUM CHLORIDE, SODIUM LACTATE AND CALCIUM CHLORIDE: 600; 310; 30; 20 INJECTION, SOLUTION INTRAVENOUS at 08:05

## 2025-06-23 ASSESSMENT — PAIN - FUNCTIONAL ASSESSMENT
PAIN_FUNCTIONAL_ASSESSMENT: ACTIVITIES ARE NOT PREVENTED

## 2025-06-23 ASSESSMENT — PAIN DESCRIPTION - LOCATION
LOCATION: INCISION
LOCATION: ABDOMEN

## 2025-06-23 ASSESSMENT — PAIN DESCRIPTION - DESCRIPTORS
DESCRIPTORS: SORE
DESCRIPTORS: SORE

## 2025-06-23 ASSESSMENT — PAIN SCALES - GENERAL
PAINLEVEL_OUTOF10: 2
PAINLEVEL_OUTOF10: 2
PAINLEVEL_OUTOF10: 0

## 2025-06-23 ASSESSMENT — PAIN DESCRIPTION - ORIENTATION
ORIENTATION: LOWER
ORIENTATION: LOWER

## 2025-06-23 NOTE — CONSULTS
Session ID: [id number]  Session Duration: 14 minutes  Language: Burmese   ID: #[id number]   Name: Barbara

## 2025-06-23 NOTE — ANESTHESIA PRE PROCEDURE
Department of Anesthesiology  Preprocedure Note       Name:  Bessie Mccoy   Age:  29 y.o.  :  1995                                          MRN:  [id number]         Date:  2025      Surgeon: Surgeon(s):  Myesha Perez MD    Procedure: Procedure(s):   SECTION    Medications prior to admission:   Prior to Admission medications    Medication Sig Start Date End Date Taking? Authorizing Provider   ferrous sulfate (IRON 325) 325 (65 Fe) MG tablet Take 1 tablet by mouth daily 3/29/25  Yes Myesha Perez MD   Aspirin 81 MG CAPS Take 1 tablet by mouth daily 24  Yes Myesha Perez MD   Prenatal Vit-Fe Fumarate-FA (PRENATAL VITAMIN) 27-0.8 MG TABS Take 1 tablet by mouth daily 24  Yes Myesha Perez MD   terconazole (TERAZOL 3) 0.8 % vaginal cream Place vaginally nightly x 3 nights  Patient not taking: Reported on 2025   Myesha Perez MD   terconazole (TERAZOL 3) 0.8 % vaginal cream Place vaginally nightly x 3 nights  Patient not taking: Reported on 2025   Myesha Perez MD       Current medications:    Current Facility-Administered Medications   Medication Dose Route Frequency Provider Last Rate Last Admin   • lactated ringers infusion   IntraVENous Continuous Myesha Perez  mL/hr at 25 0609 New Bag at 25 0609   • lactated ringers bolus 1,000 mL  1,000 mL IntraVENous Once Myesha Perez MD 1,000 mL/hr at 25 0707 1,000 mL at 25 0707   • sodium chloride flush 0.9 % injection 10 mL  10 mL IntraVENous 2 times per day Myesha Perez MD       • sodium chloride flush 0.9 % injection 10 mL  10 mL IntraVENous PRN Myesha Perez MD       • 0.9 % sodium chloride infusion   IntraVENous PRN Myesha Perez MD       • ceFAZolin (ANCEF) 3,000 mg in sodium chloride 0.9 % 100 mL (addEASE)  3,000 mg IntraVENous Once Myesha Perez MD       • oxytocin (PITOCIN) 30 units in 500 mL infusion  87.3 george-units/min IntraVENous Continuous PRN Myesha Perez MD

## 2025-06-23 NOTE — L&D DELIVERY NOTE
Rafa Mccoy, Female Bessie [[id number]]      Labor Events     Labor: No   Steroids: None  Cervical Ripening Date/Time:      Antibiotics Received during Labor: No  Rupture Date/Time:  25 09:09:00   Rupture Type: AROM  Fluid Color: Clear  Fluid Odor: None  Fluid Volume: Large  Induction: None  Augmentation: None  Labor Complications: None       Anesthesia    Method: Spinal       Delivery Details      Delivery Date: 25 Delivery Time: 09:09:00   Delivery Type: , Low Transverse  Trial of Labor?: No   Categorization: Primary   Priority: scheduled  Indications for : Maternal Request       Skin Incision Type: Low Transverse  Uterine Incision: Low Transverse       [place] Presentation    Presentation: Vertex       Shoulder Dystocia    Shoulder Dystocia Present?: No       Assisted Delivery Details    Forceps Attempted?: No  Vacuum Extractor Attempted?: No                           Cord    Vessels: 3 Vessels  Complications: None  Delayed Cord Clamping?: Yes  Cord Clamped Date/Time: 2025 09:10:00  Cord Blood Disposition: Discard  Gases Sent?: No              Placenta    Date/Time: 2025 07:56:14  Removal: Manual Removal  Appearance: Intact  Disposition: Discarded       Lacerations    Episiotomy: None  Perineal Lacerations: None  Other Lacerations: no non-perineal laceration       Vaginal Counts    Initial Count Personnel: N/A  Initial Count Verified By: N/A  Final Count Personnel: N/A  Final Count Verified By: N/A       Blood Loss  Mother: Bessie Thacker #[id number]     Start of Mother's Information      Delivery Blood Loss   Intrapartum & Postpartum: 25 0835 - 25 1056    Delivery Admission: 25 0523 - 25 1056         Intrapartum & Postpartum Delivery Admission    Quantitative Blood Loss (mL) Hospital Encounter 941 grams 941 grams    Total  941 mL 941 mL               End of Mother's Information  Mother: Bessie Thacker

## 2025-06-23 NOTE — PROGRESS NOTES
0700: Bedside and Verbal shift change report given to BENNIE Ramirez (oncoming nurse) by ROSENDO Jackson (offgoing nurse). Report included the following information Nurse Handoff Report, Index, Adult Overview, MAR, and Recent Results.     0712: Dr. Parr at bedside to consent pt for spinal.    0738: Dr. Perez at bedside to consent pt    0835: Pt to OR 1 for  Section    0909: Viable female infant delivered at this time.    0952: Pt returned to triage bay 2 for recovery    1105: Dr. Perez called in regards to pt's PP bleeding. VORB, methergine and txa.     1142: Message sent via perfect serve to Dr. Perez regarding pt's PP bleeding.     1150: Dr. Perez called. MD to come to bedside for eval    1200: Dr. Perez at bedside. Internal exam performed at this time. Large amount of small clots expressed at this time. VORB for 400 miso.     1350: TRANSFER - OUT REPORT:    Verbal report given to SANDRO Ponce on Bessie Mccoy  being transferred to MIU for routine progression of patient care       Report consisted of patient's Situation, Background, Assessment and   Recommendations(SBAR).     Information from the following report(s) Nurse Handoff Report, Index, Adult Overview, Surgery Report, Intake/Output, MAR, Recent Results, and Event Log was reviewed with the receiving nurse.           Lines:   Peripheral IV 25 Left Forearm (Active)   Site Assessment Clean, dry & intact 25 1000   Line Status Infusing 25 1000   Line Care Connections checked and tightened 25 0720   Phlebitis Assessment No symptoms 25 1000   Infiltration Assessment 0 25 1000   Alcohol Cap Used No 25 1000   Dressing Status Clean, dry & intact 25 1000   Dressing Type Transparent 25 1000        Opportunity for questions and clarification was provided.      Patient transported with:  Registered Nurse    Fundal performed, lochia assessed and baby bands verified with receiving RN upon arrival to unit.

## 2025-06-23 NOTE — PROGRESS NOTES
0530 Patient arrived for scheduled primary C Section.     Denies feeling contractions, vaginal bleeding, leaking of fluid. + FM. NPO since prior to MN    0700 Bedside and Verbal shift change report given to TAYLOR Ramirez RN (oncoming nurse) by SOHAIL Jackson RN (offgoing nurse). Report included the following information Nurse Handoff Report, Adult Overview, Intake/Output, MAR, Recent Results, and Quality Measures.

## 2025-06-23 NOTE — OP NOTE
Operative Note    Name: Bessie Mccoy   Medical Record Number: [id number]   YOB: 1995  Today's Date: 2025      Pre-operative Diagnosis: Excessive fetal growth affecting management of pregnancy in third trimester, single or unspecified fetus [O36.63X0]  Delivery by elective  section [O82]      Post-operative Diagnosis:TLBI    Operation: low transverse  section Procedure(s):   SECTION    Surgeon(s):  Myesha Perez MD    Anesthesia: Spinal    EBL: 750cc    Prophylactic Antibiotics: Ancef  DVT Prophylaxis: Sequential Compression Devices         Fetal Description: escamilla     Birth Information:   Information for the patient's :  Rafa Mccoy, Female Bessie [[id number]]   @[id number]@    Umbilical Cord: Nuchal Cord x  1    Placenta:  manual removal    Specimens: none           Complications:  none    Implants: none    Scrub Person First: Aakash Dukes  Scrub Person Second: Rukhsana Reyna RN        Procedure Detail:      After proper patient identification and consent, the patient was taken to the operating room, where spinal anesthesia was administered and found to be adequate. Barnes catheter had been placed using sterile technique.  The patient was prepped and draped in the normal sterile fashion.The abdomen was entered using the Pfannenstiel technique. The peritoneum was entered bluntely well superior to the bladder without any apparent injury. An Rey retractor was placed.  Palpation revealed no bowel below the retractor. The bladder flap was created without difficulty. A low transverse uterine incision was made with the scalpel and extended with blunt finger dissection. Amniotomy was performed and the fluid was medium amount clear.  The baby’s head elevated and the remainder of the infant was then delivered atraumatically. The nose and mouth were suctioned. The cord was clamped and cut and the baby was handed off to Nursing staff in

## 2025-06-23 NOTE — LACTATION NOTE
of life as well as the benefits of successful breast milk feeding; referred her to the Breastfeeding booklet about this information.   She acknowledges understanding of information reviewed and states that it is her plan to breast and formula feed her infant.  Will support her choice and offer additional information as needed.     Pt will successfully establish breastfeeding by feeding in response to early feeding cues   or wake every 3h, will obtain deep latch, and will keep log of feedings/output.  Taught to BF at hunger cues and or q 2-3 hrs and to offer 10-20 drops of hand expressed colostrum at any non-feeds.      Left Breast: Soft  Left Nipple: Protrude  Right Nipple: Protrude  Right Breast: Soft  Position and Latch: Independently                 Latch: Grasps breast, tongue down, lips flanged, rhythmic sucking  Audible Swallowing: None  Type of Nipple: Everted (after stimulation)  Comfort (Breast/Nipple): Soft/non-tender  Hold (Positioning): Full assist, teach one side, mother does other, staff holds  LATCH Score: 7     Care Plan Initiated: Reluctant nurser

## 2025-06-23 NOTE — CONSULTS
Session ID: [id number]  Session Duration: 13 minutes  Language: Citizen of [place]   ID: #[id number]   Name: Andreea

## 2025-06-23 NOTE — H&P
History & Physical    Name: Bessie Mccoy MRN: [id number]  SSN: [id number]    YOB: 1995  Age: 29 y.o.  Sex: female        Subjective:     Estimated Date of Delivery: 25  OB History          4    Para   1    Term   1            AB   2    Living   1         SAB        IAB   2    Ectopic        Molar        Multiple        Live Births   1                Ms. Rafa Mccoy is admitted with pregnancy at 39w4d for  Section. Prenatal course was complicated by maternal obesityand fetal macrosomia by US. Patient requested primary CS because she had a very difficult delivery last time with smaller baby and was traumatized and does not want to do that again.  Most recent US 25 EFW 10# 8oz 4800g. Hx of normal glucola. Please see prenatal records for details.    Patient Active Problem List    Diagnosis Date Noted     delivery affecting  2025    Supervision of other normal pregnancy, antepartum 2024     EDC 2025 D=US  NIPTS normal female  Horizon - sickle carrier, FOB negative  Obesity BMI >40  MFM  Baby ASA  Early glucola normal 103  Flu vaccine 24  Anemia  Measles NONIMMUNE - MMR pp   37 weeks: LGA growth, BPP 8/8 mild polyhydramnios   3970 grams (>99%tile) and AC >99%%tile             Past Medical History:   Diagnosis Date    Papanicolaou smear for cervical cancer screening 2024    Normal     Past Surgical History:   Procedure Laterality Date    APPENDECTOMY  2018     Social History     Occupational History    Not on file   Tobacco Use    Smoking status: Never    Smokeless tobacco: Never   Substance and Sexual Activity    Alcohol use: Not Currently    Drug use: Never    Sexual activity: Yes     Partners: Male     Birth control/protection: None     Family History   Problem Relation Age of Onset    Hypertension Maternal Grandfather     Hypertension Mother     Asthma Father        No Known Allergies  Prior to Admission medications

## 2025-06-24 LAB
ERYTHROCYTE [DISTWIDTH] IN BLOOD BY AUTOMATED COUNT: 15.2 % (ref 11.5–14.5)
HCT VFR BLD AUTO: 24.8 % (ref 35–47)
HGB BLD-MCNC: 8.3 G/DL (ref 11.5–16)
MCH RBC QN AUTO: 28 PG (ref 26–34)
MCHC RBC AUTO-ENTMCNC: 33.5 G/DL (ref 30–36.5)
MCV RBC AUTO: 83.8 FL (ref 80–99)
NRBC # BLD: 0 K/UL (ref 0–0.01)
NRBC BLD-RTO: 0 PER 100 WBC
PLATELET # BLD AUTO: 138 K/UL (ref 150–400)
PMV BLD AUTO: 11.8 FL (ref 8.9–12.9)
RBC # BLD AUTO: 2.96 M/UL (ref 3.8–5.2)
T PALLIDUM AB SER QL IA: NON REACTIVE
WBC # BLD AUTO: 12.1 K/UL (ref 3.6–11)

## 2025-06-24 PROCEDURE — 6360000002 HC RX W HCPCS: Performed by: OBSTETRICS & GYNECOLOGY

## 2025-06-24 PROCEDURE — 1120000000 HC RM PRIVATE OB

## 2025-06-24 PROCEDURE — 85027 COMPLETE CBC AUTOMATED: CPT

## 2025-06-24 PROCEDURE — 6370000000 HC RX 637 (ALT 250 FOR IP): Performed by: OBSTETRICS & GYNECOLOGY

## 2025-06-24 PROCEDURE — 36415 COLL VENOUS BLD VENIPUNCTURE: CPT

## 2025-06-24 RX ORDER — FERROUS SULFATE 325(65) MG
325 TABLET ORAL 2 TIMES DAILY WITH MEALS
Status: DISCONTINUED | OUTPATIENT
Start: 2025-06-24 | End: 2025-06-25 | Stop reason: HOSPADM

## 2025-06-24 RX ORDER — IBUPROFEN 800 MG/1
800 TABLET, FILM COATED ORAL EVERY 8 HOURS PRN
Status: DISCONTINUED | OUTPATIENT
Start: 2025-06-24 | End: 2025-06-25 | Stop reason: HOSPADM

## 2025-06-24 RX ADMIN — Medication 1 TABLET: at 10:35

## 2025-06-24 RX ADMIN — IBUPROFEN 800 MG: 800 TABLET ORAL at 10:36

## 2025-06-24 RX ADMIN — KETOROLAC TROMETHAMINE 30 MG: 30 INJECTION, SOLUTION INTRAMUSCULAR at 04:33

## 2025-06-24 RX ADMIN — ENOXAPARIN SODIUM 30 MG: 100 INJECTION SUBCUTANEOUS at 10:37

## 2025-06-24 RX ADMIN — FERROUS SULFATE TAB 325 MG (65 MG ELEMENTAL FE) 325 MG: 325 (65 FE) TAB at 17:01

## 2025-06-24 RX ADMIN — DOCUSATE SODIUM 50MG AND SENNOSIDES 8.6MG 1 TABLET: 8.6; 5 TABLET, FILM COATED ORAL at 10:35

## 2025-06-24 RX ADMIN — ACETAMINOPHEN 1000 MG: 500 TABLET ORAL at 20:20

## 2025-06-24 RX ADMIN — ACETAMINOPHEN 1000 MG: 500 TABLET ORAL at 12:44

## 2025-06-24 RX ADMIN — SIMETHICONE 80 MG: 80 TABLET, CHEWABLE ORAL at 18:28

## 2025-06-24 RX ADMIN — IBUPROFEN 800 MG: 800 TABLET ORAL at 18:15

## 2025-06-24 RX ADMIN — ENOXAPARIN SODIUM 30 MG: 100 INJECTION SUBCUTANEOUS at 20:56

## 2025-06-24 RX ADMIN — ACETAMINOPHEN 1000 MG: 500 TABLET ORAL at 04:33

## 2025-06-24 ASSESSMENT — PAIN DESCRIPTION - LOCATION
LOCATION: INCISION

## 2025-06-24 ASSESSMENT — PAIN DESCRIPTION - DESCRIPTORS
DESCRIPTORS: SORE

## 2025-06-24 ASSESSMENT — PAIN SCALES - GENERAL
PAINLEVEL_OUTOF10: 3
PAINLEVEL_OUTOF10: 4
PAINLEVEL_OUTOF10: 2

## 2025-06-24 ASSESSMENT — PAIN DESCRIPTION - ORIENTATION
ORIENTATION: LOWER

## 2025-06-24 ASSESSMENT — PAIN - FUNCTIONAL ASSESSMENT
PAIN_FUNCTIONAL_ASSESSMENT: ACTIVITIES ARE NOT PREVENTED

## 2025-06-24 NOTE — CARE COORDINATION
2025  1:40 PM    Care Management Progress Note    Reason for Admission:   Excessive fetal growth affecting management of pregnancy in third trimester, single or unspecified fetus [O36.63X0]  Delivery by elective  section [O82]   delivery affecting  [P03.4]  Procedure(s) (LRB):   SECTION (N/A)  1 Day Post-Op    Patient Admission Status: Inpatient    Transition of care plan:    EMR completed.         25 1340   Service Assessment   Patient Orientation Alert and Oriented   Cognition Alert   History Provided By Patient   Primary Caregiver Self   Support Systems Spouse/Significant Other   PCP Verified by CM Yes   Last Visit to PCP Within last 3 months   Prior Functional Level Independent in ADLs/IADLs   Current Functional Level Independent in ADLs/IADLs   Can patient return to prior living arrangement Yes   Ability to make needs known: Good   Family able to assist with home care needs: Yes   Would you like for me to discuss the discharge plan with any other family members/significant others, and if so, who? No   Financial Resources Medicaid     Rey Limon CM

## 2025-06-24 NOTE — PROGRESS NOTES
Post-Operative Day Number 1 Progress Note    Patient doing well post-op day 1 from  delivery without significant complaints.  Pain controlled on current medication.  Voiding without difficulty, normal lochia.    Vitals:  Patient Vitals for the past 8 hrs:   BP Temp Temp src Pulse Resp SpO2   25 0600 (!) 104/55 97.9 °F (36.6 °C) Oral 66 18 98 %     Temp (24hrs), Av.1 °F (36.7 °C), Min:97.9 °F (36.6 °C), Max:98.2 °F (36.8 °C)      Vital signs stable, afebrile.    Exam:  Patient without distress.               Abdomen soft, fundus firm at level of umbilicus, nontender.                 Incision dry and clean without erythema.               Lower extremities are negative for swelling, cords or tenderness.    Labs:   Recent Results (from the past 24 hours)   CBC    Collection Time: 25  6:02 AM   Result Value Ref Range    WBC 12.1 (H) 3.6 - 11.0 K/uL    RBC 2.96 (L) 3.80 - 5.20 M/uL    Hemoglobin 8.3 (L) 11.5 - 16.0 g/dL    Hematocrit 24.8 (L) 35.0 - 47.0 %    MCV 83.8 80.0 - 99.0 FL    MCH 28.0 26.0 - 34.0 PG    MCHC 33.5 30.0 - 36.5 g/dL    RDW 15.2 (H) 11.5 - 14.5 %    Platelets 138 (L) 150 - 400 K/uL    MPV 11.8 8.9 - 12.9 FL    Nucleated RBCs 0.0 0  WBC    nRBC 0.00 0.00 - 0.01 K/uL       Assessment and Plan:  Patient appears to be having uncomplicated post- course.  Continue routine post-op care and maternal education  Anemia due to pp blood loss - start iron  .       normal...

## 2025-06-25 VITALS
SYSTOLIC BLOOD PRESSURE: 116 MMHG | WEIGHT: 277.56 LBS | HEART RATE: 93 BPM | TEMPERATURE: 98.2 F | BODY MASS INDEX: 46.24 KG/M2 | OXYGEN SATURATION: 99 % | RESPIRATION RATE: 14 BRPM | HEIGHT: 65 IN | DIASTOLIC BLOOD PRESSURE: 58 MMHG

## 2025-06-25 PROCEDURE — 6360000002 HC RX W HCPCS: Performed by: OBSTETRICS & GYNECOLOGY

## 2025-06-25 PROCEDURE — 6370000000 HC RX 637 (ALT 250 FOR IP): Performed by: OBSTETRICS & GYNECOLOGY

## 2025-06-25 RX ORDER — OXYCODONE HYDROCHLORIDE 5 MG/1
5 TABLET ORAL EVERY 4 HOURS PRN
Qty: 20 TABLET | Refills: 0 | Status: SHIPPED | OUTPATIENT
Start: 2025-06-25 | End: 2025-06-30

## 2025-06-25 RX ORDER — IBUPROFEN 800 MG/1
800 TABLET, FILM COATED ORAL EVERY 8 HOURS PRN
Qty: 30 TABLET | Refills: 1 | Status: SHIPPED | OUTPATIENT
Start: 2025-06-25

## 2025-06-25 RX ADMIN — ENOXAPARIN SODIUM 30 MG: 100 INJECTION SUBCUTANEOUS at 09:53

## 2025-06-25 RX ADMIN — ACETAMINOPHEN 1000 MG: 500 TABLET ORAL at 05:30

## 2025-06-25 RX ADMIN — FERROUS SULFATE TAB 325 MG (65 MG ELEMENTAL FE) 325 MG: 325 (65 FE) TAB at 09:53

## 2025-06-25 RX ADMIN — IBUPROFEN 800 MG: 800 TABLET ORAL at 09:54

## 2025-06-25 RX ADMIN — IBUPROFEN 800 MG: 800 TABLET ORAL at 02:20

## 2025-06-25 RX ADMIN — Medication 1 TABLET: at 09:54

## 2025-06-25 ASSESSMENT — PAIN DESCRIPTION - LOCATION
LOCATION: INCISION
LOCATION: ABDOMEN
LOCATION: INCISION

## 2025-06-25 ASSESSMENT — PAIN DESCRIPTION - DESCRIPTORS
DESCRIPTORS: SORE
DESCRIPTORS: ACHING;BURNING;SORE
DESCRIPTORS: SORE

## 2025-06-25 ASSESSMENT — PAIN SCALES - GENERAL
PAINLEVEL_OUTOF10: 4

## 2025-06-25 ASSESSMENT — PAIN DESCRIPTION - ORIENTATION
ORIENTATION: LOWER

## 2025-06-25 ASSESSMENT — PAIN - FUNCTIONAL ASSESSMENT
PAIN_FUNCTIONAL_ASSESSMENT: ACTIVITIES ARE NOT PREVENTED
PAIN_FUNCTIONAL_ASSESSMENT: ACTIVITIES ARE NOT PREVENTED

## 2025-06-25 NOTE — PROGRESS NOTES
Discharge instructions given to patient at bedside via  including but not limited to signs and symptoms and who to call; restrictions and limitations; postpartum depression. All questions answered.    Discharge supplies given to patient.    AVS reviewed and charted.

## 2025-06-25 NOTE — CONSULTS
Session ID: [id number]  Session Duration: 11 minutes  Language: Cape Verdean   ID: #[id number]   Name: Edgard

## 2025-06-25 NOTE — DISCHARGE SUMMARY
Obstetrical Discharge Summary     Name: Bessie Mccoy MRN: [id number]  SSN: [id number]    YOB: 1995  Age: 29 y.o.  Sex: female      Admit Date: 2025    Discharge Date: 2025     Admitting Physician: Myesha Perez MD     Attending Physician:  Myesah Perez MD     Admission Diagnoses: Excessive fetal growth affecting management of pregnancy in third trimester, single or unspecified fetus [O36.63X0]  Delivery by elective  section [O82]   delivery affecting  [P03.4]    Discharge Diagnoses:   Information for the patient's :  Rafa Mccoy, Female Bessie [[id number]]   @[phone]@     Delivery Type: , Low Transverse   By Delivering Clinician:MYESHA PEREZ   Delivery Date /Time: 2025 9:09 AM '    Additional Diagnoses:  No components found for: \"OBEXTABORH\", \"OBEXTABSCRN\", \"OBEXTRUBELLA\", \"OBEXTGRBS\"    Hospital Course: Normal hospital course following the delivery.    Disposition: Home  Condition: Good    Patient Instructions:   Current Discharge Medication List        START taking these medications    Details   ibuprofen (ADVIL;MOTRIN) 800 MG tablet Take 1 tablet by mouth every 8 hours as needed for Pain  Qty: 30 tablet, Refills: 1      oxyCODONE (ROXICODONE) 5 MG immediate release tablet Take 1 tablet by mouth every 4 hours as needed for Pain for up to 5 days. Intended supply: 3 days. Take lowest dose possible to manage pain Max Daily Amount: 30 mg  Qty: 20 tablet, Refills: 0    Comments: Reduce doses taken as pain becomes manageable  Associated Diagnoses: Post-op pain           CONTINUE these medications which have NOT CHANGED    Details   ferrous sulfate (IRON 325) 325 (65 Fe) MG tablet Take 1 tablet by mouth daily  Qty: 90 tablet, Refills: 5      Prenatal Vit-Fe Fumarate-FA (PRENATAL VITAMIN) 27-0.8 MG TABS Take 1 tablet by mouth daily  Qty: 90 tablet, Refills: 5    Comments: May substitute any prenatal vitamin with iron covered by insurance

## 2025-06-25 NOTE — CARE COORDINATION
6/25/2025  12:54 PM    CM notified by lactation of breast pump need. CM met with MOB and confirmed she has not ordered a pump yet. CM obtained order and [employer] pump provided to MOB. MOB signed for pump and completed order sent via email to [employer] pump.    Rey Limon CM

## 2025-06-25 NOTE — DISCHARGE INSTRUCTIONS
POST DELIVERY DISCHARGE INSTRUCTIONS    Name: Bessie Mccoy  YOB: 1995  Primary Diagnosis: [unfilled]    General:     Diet/Diet Restrictions:  Eight 8-ounce glasses of fluid daily (water, juices); avoid excessive caffeine intake.  Meals/snacks as desired which are high in fiber and carbohydrates and low in fat and cholesterol.    Medications:   {Medication reconciliation information is now added to the patient's AVS automatically when it is printed.  There is no need to use this SmartLink in discharge instructions.  Highlight this text and delete it to clear this message}      Physical Activity / Restrictions / Safety:     Avoid heavy lifting, no more that 8 lbs. For 2-3 weeks; No driving while taking narcotic pain medication. Post  patients should not drive until pain free.  No intercourse 4-6 weeks, no douching or tampon use. May resume exercise in 6 weeks.         Discharge Instructions/Special Treatment/Home Care Needs:     Continue prenatal vitamins.  Continue to use squirt bottle with warm water on your episiotomy after each bathroom use until bleeding stops.  If steri-strips applied to your incision, remove in 7 days.  Take stool softeners daily.    Call your doctor for the following:     Fever over 101 degrees by mouth.  Vaginal bleeding heavier than a normal menstrual period or lost larger than a golf ball.  Red streaks or increased swelling of legs, painful red streaks on your breast.  Painful urination, or increased pain, redness or discharge with your incision.    Pain Management:     Pain Management:   Take Acetaminophen (Tylenol) or Ibuprofen (Advil, Motrin), as directed for pain. Use a warm Sitz bath 3 times daily to relieve episiotomy or hemorrhoidal discomfort. Heating pad to  incision as needed. For hemorrhoidal discomfort, use Tucks and Anusol cream as needed and directed.    Follow-Up Care:     Pt to scheduled follow-up appt in 6 weeks    Telephone number:

## 2025-06-28 NOTE — ANESTHESIA POSTPROCEDURE EVALUATION
Department of Anesthesiology  Postprocedure Note    Patient: Bessie Mccoy  MRN: [id number]  YOB: 1995  Date of evaluation: 2025    Procedure Summary       Date: 25 Room / Location: [facility] L&D 02 / [facility] L&D OR    Anesthesia Start: 834 Anesthesia Stop: 956    Procedure:  SECTION (Abdomen) Diagnosis:       Excessive fetal growth affecting management of pregnancy in third trimester, single or unspecified fetus      Delivery by elective  section      (Excessive fetal growth affecting management of pregnancy in third trimester, single or unspecified fetus [O36.63X0])      (Delivery by elective  section [O82])    Surgeons: Myesha Perez MD Responsible Provider: Rober Parr DO    Anesthesia Type: Spinal ASA Status: 3            Anesthesia Type: Spinal    Hugh Phase I: Hugh Score: 9    Hugh Phase II: Hugh Score: 10    Anesthesia Post Evaluation    Comments: Transferred to floor by nursing per protocol.    No notable events documented.

## 2025-07-01 ENCOUNTER — HOSPITAL ENCOUNTER (OUTPATIENT)
Facility: HOSPITAL | Age: 30
Setting detail: OBSERVATION
Discharge: HOME | End: 2025-07-03
Attending: OBSTETRICS & GYNECOLOGY | Admitting: OBSTETRICS & GYNECOLOGY
Payer: MEDICAID

## 2025-07-01 ENCOUNTER — TELEPHONE (OUTPATIENT)
Age: 30
End: 2025-07-01

## 2025-07-01 ENCOUNTER — POSTPARTUM VISIT (OUTPATIENT)
Age: 30
End: 2025-07-01
Payer: MEDICAID

## 2025-07-01 VITALS
DIASTOLIC BLOOD PRESSURE: 81 MMHG | WEIGHT: 254.2 LBS | BODY MASS INDEX: 42.3 KG/M2 | HEART RATE: 100 BPM | SYSTOLIC BLOOD PRESSURE: 121 MMHG

## 2025-07-01 DIAGNOSIS — T81.49XA INCISIONAL INFECTION: Primary | ICD-10-CM

## 2025-07-01 LAB
ALBUMIN SERPL-MCNC: 2.2 G/DL (ref 3.5–5)
ALBUMIN/GLOB SERPL: 0.5 (ref 1.1–2.2)
ALP SERPL-CCNC: 115 U/L (ref 45–117)
ALT SERPL-CCNC: 17 U/L (ref 12–78)
ANION GAP SERPL CALC-SCNC: 8 MMOL/L (ref 2–12)
AST SERPL-CCNC: 11 U/L (ref 15–37)
BILIRUB SERPL-MCNC: 0.2 MG/DL (ref 0.2–1)
BUN SERPL-MCNC: 11 MG/DL (ref 6–20)
BUN/CREAT SERPL: 13 (ref 12–20)
CALCIUM SERPL-MCNC: 8.8 MG/DL (ref 8.5–10.1)
CHLORIDE SERPL-SCNC: 110 MMOL/L (ref 97–108)
CO2 SERPL-SCNC: 24 MMOL/L (ref 21–32)
CREAT SERPL-MCNC: 0.83 MG/DL (ref 0.55–1.02)
ERYTHROCYTE [DISTWIDTH] IN BLOOD BY AUTOMATED COUNT: 15.2 % (ref 11.5–14.5)
GLOBULIN SER CALC-MCNC: 4.6 G/DL (ref 2–4)
GLUCOSE SERPL-MCNC: 102 MG/DL (ref 65–100)
HCT VFR BLD AUTO: 27.4 % (ref 35–47)
HGB BLD-MCNC: 9.1 G/DL (ref 11.5–16)
MCH RBC QN AUTO: 27.9 PG (ref 26–34)
MCHC RBC AUTO-ENTMCNC: 33.2 G/DL (ref 30–36.5)
MCV RBC AUTO: 84 FL (ref 80–99)
NRBC # BLD: 0 K/UL (ref 0–0.01)
NRBC BLD-RTO: 0 PER 100 WBC
PLATELET # BLD AUTO: 359 K/UL (ref 150–400)
PMV BLD AUTO: 9.6 FL (ref 8.9–12.9)
POTASSIUM SERPL-SCNC: 3.6 MMOL/L (ref 3.5–5.1)
PROT SERPL-MCNC: 6.8 G/DL (ref 6.4–8.2)
RBC # BLD AUTO: 3.26 M/UL (ref 3.8–5.2)
SODIUM SERPL-SCNC: 142 MMOL/L (ref 136–145)
WBC # BLD AUTO: 11.2 K/UL (ref 3.6–11)

## 2025-07-01 PROCEDURE — NBSRV NON-BILLABLE SERVICE: Performed by: OBSTETRICS & GYNECOLOGY

## 2025-07-01 PROCEDURE — G0378 HOSPITAL OBSERVATION PER HR: HCPCS

## 2025-07-01 PROCEDURE — 6360000002 HC RX W HCPCS: Performed by: OBSTETRICS & GYNECOLOGY

## 2025-07-01 PROCEDURE — G0379 DIRECT REFER HOSPITAL OBSERV: HCPCS

## 2025-07-01 PROCEDURE — 96365 THER/PROPH/DIAG IV INF INIT: CPT

## 2025-07-01 PROCEDURE — 80053 COMPREHEN METABOLIC PANEL: CPT

## 2025-07-01 PROCEDURE — 2580000003 HC RX 258: Performed by: OBSTETRICS & GYNECOLOGY

## 2025-07-01 PROCEDURE — 85027 COMPLETE CBC AUTOMATED: CPT

## 2025-07-01 PROCEDURE — 6370000000 HC RX 637 (ALT 250 FOR IP): Performed by: OBSTETRICS & GYNECOLOGY

## 2025-07-01 PROCEDURE — 36415 COLL VENOUS BLD VENIPUNCTURE: CPT

## 2025-07-01 PROCEDURE — 99222 1ST HOSP IP/OBS MODERATE 55: CPT | Performed by: OBSTETRICS & GYNECOLOGY

## 2025-07-01 RX ORDER — OXYCODONE HYDROCHLORIDE 5 MG/1
5 TABLET ORAL EVERY 4 HOURS PRN
Refills: 0 | Status: DISCONTINUED | OUTPATIENT
Start: 2025-07-01 | End: 2025-07-03 | Stop reason: HOSPADM

## 2025-07-01 RX ORDER — SODIUM CHLORIDE, SODIUM LACTATE, POTASSIUM CHLORIDE, CALCIUM CHLORIDE 600; 310; 30; 20 MG/100ML; MG/100ML; MG/100ML; MG/100ML
INJECTION, SOLUTION INTRAVENOUS CONTINUOUS
Status: DISCONTINUED | OUTPATIENT
Start: 2025-07-01 | End: 2025-07-03 | Stop reason: HOSPADM

## 2025-07-01 RX ORDER — ACETAMINOPHEN 500 MG
1000 TABLET ORAL EVERY 8 HOURS PRN
Status: DISCONTINUED | OUTPATIENT
Start: 2025-07-01 | End: 2025-07-03 | Stop reason: HOSPADM

## 2025-07-01 RX ORDER — IBUPROFEN 800 MG/1
800 TABLET, FILM COATED ORAL EVERY 6 HOURS PRN
Status: DISCONTINUED | OUTPATIENT
Start: 2025-07-01 | End: 2025-07-03 | Stop reason: HOSPADM

## 2025-07-01 RX ORDER — OXYCODONE HYDROCHLORIDE 5 MG/1
10 TABLET ORAL EVERY 4 HOURS PRN
Refills: 0 | Status: DISCONTINUED | OUTPATIENT
Start: 2025-07-01 | End: 2025-07-03 | Stop reason: HOSPADM

## 2025-07-01 RX ADMIN — PIPERACILLIN AND TAZOBACTAM 3375 MG: 3; .375 INJECTION, POWDER, LYOPHILIZED, FOR SOLUTION INTRAVENOUS at 23:43

## 2025-07-01 RX ADMIN — PIPERACILLIN AND TAZOBACTAM 4500 MG: 4; .5 INJECTION, POWDER, FOR SOLUTION INTRAVENOUS at 17:25

## 2025-07-01 RX ADMIN — IBUPROFEN 800 MG: 800 TABLET, FILM COATED ORAL at 17:25

## 2025-07-01 RX ADMIN — SODIUM CHLORIDE, SODIUM LACTATE, POTASSIUM CHLORIDE, AND CALCIUM CHLORIDE: .6; .31; .03; .02 INJECTION, SOLUTION INTRAVENOUS at 17:15

## 2025-07-01 RX ADMIN — ACETAMINOPHEN 1000 MG: 500 TABLET ORAL at 17:25

## 2025-07-01 ASSESSMENT — PAIN DESCRIPTION - DESCRIPTORS: DESCRIPTORS: ACHING;BURNING

## 2025-07-01 ASSESSMENT — PAIN DESCRIPTION - ORIENTATION: ORIENTATION: ANTERIOR

## 2025-07-01 ASSESSMENT — PAIN - FUNCTIONAL ASSESSMENT: PAIN_FUNCTIONAL_ASSESSMENT: ACTIVITIES ARE NOT PREVENTED

## 2025-07-01 ASSESSMENT — PAIN SCALES - GENERAL: PAINLEVEL_OUTOF10: 6

## 2025-07-01 ASSESSMENT — PAIN DESCRIPTION - LOCATION: LOCATION: ABDOMEN;INCISION

## 2025-07-01 NOTE — H&P
Gynecology History and Physical    Name: Bessie Mccoy MRN: [id number] SSN: [id number]    YOB: 1995  Age: 29 y.o.  Sex: female       Subjective:          Bessie is a 29 y.o.  female with a history of a primary  that was done on 2025 due to fetal macrosomia.  Today she presented the office complaining of drainage from the incision.  She denies fever or chills.  She was fine until she noticed the drainage today.  In the office she was noted to have significant cellulitis above the incision.  There was a small amount of drainage from the incision but the patient says that was a copious amount prior to presenting.  She is currently breast-feeding.        OB History          4    Para   2    Term   2            AB   2    Living   2         SAB        IAB   2    Ectopic        Molar        Multiple   0    Live Births   2              Past Medical History:   Diagnosis Date    Papanicolaou smear for cervical cancer screening 2024    Normal     Past Surgical History:   Procedure Laterality Date    APPENDECTOMY  2018     SECTION N/A 2025     SECTION performed by Myesha Perez MD at [facility] L&D OR     Social History     Occupational History    Not on file   Tobacco Use    Smoking status: Never    Smokeless tobacco: Never   Substance and Sexual Activity    Alcohol use: Not Currently    Drug use: Never    Sexual activity: Yes     Partners: Male     Birth control/protection: None     Family History   Problem Relation Age of Onset    Hypertension Maternal Grandfather     Hypertension Mother     Asthma Father         No Known Allergies  Prior to Admission medications    Medication Sig Start Date End Date Taking? Authorizing Provider   ibuprofen (ADVIL;MOTRIN) 800 MG tablet Take 1 tablet by mouth every 8 hours as needed for Pain 25   Myesha Perez MD   ferrous sulfate (IRON 325) 325 (65 Fe) MG tablet Take 1 tablet by mouth daily 3/29/25   Ana

## 2025-07-01 NOTE — TELEPHONE ENCOUNTER
PT name and  verified    28 yo last ov 25, PCS 25      PT calling, and had sent a  message this am with a picture attached.  PT requests a , obtained by RN, ID #[id number]    PT states she had a csection 8 days ago and she has liquid,blood drainage coming out of her R side of her incision and it is swollen.  PT denies any odor from the incision.  RN reviewed message and picture and saw KK/ forwarded the message.  RN consulted / and spoke with  and states she can be added to the schedule.  Per KK/TH PT to be scheduled for 130 pm.  RN informed PT, scheduled PT and advised if the incision started to open more before her appt, she needed to go to the hospital/ER.    PT verbalizes understanding.

## 2025-07-01 NOTE — CONSULTS
Session ID: [id number]  Session Duration: Longer than 52 minutes  Language: Occitan   ID: #[id number]   Name: Sherrell

## 2025-07-02 PROCEDURE — 6370000000 HC RX 637 (ALT 250 FOR IP): Performed by: OBSTETRICS & GYNECOLOGY

## 2025-07-02 PROCEDURE — 96376 TX/PRO/DX INJ SAME DRUG ADON: CPT

## 2025-07-02 PROCEDURE — 94761 N-INVAS EAR/PLS OXIMETRY MLT: CPT

## 2025-07-02 PROCEDURE — G0378 HOSPITAL OBSERVATION PER HR: HCPCS

## 2025-07-02 PROCEDURE — 2580000003 HC RX 258: Performed by: OBSTETRICS & GYNECOLOGY

## 2025-07-02 PROCEDURE — 96366 THER/PROPH/DIAG IV INF ADDON: CPT

## 2025-07-02 PROCEDURE — 99232 SBSQ HOSP IP/OBS MODERATE 35: CPT | Performed by: OBSTETRICS & GYNECOLOGY

## 2025-07-02 PROCEDURE — 6360000002 HC RX W HCPCS: Performed by: OBSTETRICS & GYNECOLOGY

## 2025-07-02 RX ORDER — CEPHALEXIN 500 MG/1
500 CAPSULE ORAL 4 TIMES DAILY
Qty: 28 CAPSULE | Refills: 0 | Status: SHIPPED | OUTPATIENT
Start: 2025-07-02 | End: 2025-07-09

## 2025-07-02 RX ADMIN — IBUPROFEN 800 MG: 800 TABLET, FILM COATED ORAL at 00:03

## 2025-07-02 RX ADMIN — OXYCODONE 5 MG: 5 TABLET ORAL at 11:13

## 2025-07-02 RX ADMIN — ACETAMINOPHEN 1000 MG: 500 TABLET ORAL at 17:55

## 2025-07-02 RX ADMIN — PIPERACILLIN AND TAZOBACTAM 3375 MG: 3; .375 INJECTION, POWDER, LYOPHILIZED, FOR SOLUTION INTRAVENOUS at 15:25

## 2025-07-02 RX ADMIN — PIPERACILLIN AND TAZOBACTAM 3375 MG: 3; .375 INJECTION, POWDER, LYOPHILIZED, FOR SOLUTION INTRAVENOUS at 23:00

## 2025-07-02 RX ADMIN — ACETAMINOPHEN 1000 MG: 500 TABLET ORAL at 11:13

## 2025-07-02 RX ADMIN — IBUPROFEN 800 MG: 800 TABLET, FILM COATED ORAL at 06:44

## 2025-07-02 RX ADMIN — PIPERACILLIN AND TAZOBACTAM 3375 MG: 3; .375 INJECTION, POWDER, LYOPHILIZED, FOR SOLUTION INTRAVENOUS at 06:45

## 2025-07-02 RX ADMIN — IBUPROFEN 800 MG: 800 TABLET, FILM COATED ORAL at 22:59

## 2025-07-02 RX ADMIN — IBUPROFEN 800 MG: 800 TABLET, FILM COATED ORAL at 15:24

## 2025-07-02 RX ADMIN — OXYCODONE 5 MG: 5 TABLET ORAL at 15:24

## 2025-07-02 ASSESSMENT — PAIN SCALES - GENERAL
PAINLEVEL_OUTOF10: 4
PAINLEVEL_OUTOF10: 4
PAINLEVEL_OUTOF10: 2
PAINLEVEL_OUTOF10: 3
PAINLEVEL_OUTOF10: 2

## 2025-07-02 ASSESSMENT — PAIN DESCRIPTION - LOCATION
LOCATION: ABDOMEN;INCISION
LOCATION: ABDOMEN
LOCATION: ABDOMEN;INCISION

## 2025-07-02 ASSESSMENT — PAIN - FUNCTIONAL ASSESSMENT: PAIN_FUNCTIONAL_ASSESSMENT: ACTIVITIES ARE NOT PREVENTED

## 2025-07-02 ASSESSMENT — PAIN DESCRIPTION - DESCRIPTORS
DESCRIPTORS: ACHING;SORE
DESCRIPTORS: SORE
DESCRIPTORS: SORE

## 2025-07-02 ASSESSMENT — PAIN DESCRIPTION - ORIENTATION
ORIENTATION: LOWER

## 2025-07-02 NOTE — PROGRESS NOTES
Gynecology Progress Note    Bessie Mccoy  HD #2    She is without significant complaints. Pain controlled on current medication.  Voiding without difficulty. Feels better today      Vitals:  BP 99/60   Pulse 85   Temp 97.9 °F (36.6 °C)   Resp 16   SpO2 97%   Temp (24hrs), Av.9 °F (36.6 °C), Min:97.7 °F (36.5 °C), Max:98.1 °F (36.7 °C)      Last 24hr Input/Output:    Intake/Output Summary (Last 24 hours) at 2025 1813  Last data filed at 2025 0358  Gross per 24 hour   Intake 50 ml   Output --   Net 50 ml          Exam:  General: alert, appears stated age, and cooperative         Abdomen: significantly less erythema, minimal drainage on packing          Labs:   Lab Results   Component Value Date/Time    WBC 11.2 2025 05:01 PM    WBC 12.1 2025 06:02 AM    WBC 9.7 2025 06:15 AM    WBC 11.0 2025 03:00 PM    WBC 11.3 2024 12:00 AM    WBC 11.4 10/23/2024 09:00 PM    WBC 10.7 2023 04:35 PM    WBC 15.0 2022 04:30 PM    WBC 11.7 2022 03:28 PM    WBC 9.5 2022 05:29 PM    HGB 9.1 2025 05:01 PM    HGB 8.3 2025 06:02 AM    HGB 11.1 2025 06:15 AM    HGB 10.7 2025 03:00 PM    HGB 11.6 2024 12:00 AM    HGB 12.4 10/23/2024 09:00 PM    HGB 12.1 2023 04:35 PM    HGB 11.6 2022 04:30 PM    HGB 12.0 2022 03:28 PM    HGB 12.9 2022 05:29 PM    HCT 27.4 2025 05:01 PM    HCT 24.8 2025 06:02 AM    HCT 32.8 2025 06:15 AM    HCT 32.2 2025 03:00 PM    HCT 35.7 2024 12:00 AM    HCT 36.5 10/23/2024 09:00 PM    HCT 34.7 2023 04:35 PM    HCT 35.2 2022 04:30 PM    HCT 36.2 2022 03:28 PM    HCT 37.5 2022 05:29 PM     2025 05:01 PM     2025 06:02 AM     2025 06:15 AM     2025 03:00 PM     2024 12:00 AM     10/23/2024 09:00 PM     2023 04:35 PM     2022 04:30 PM      07/20/2022 03:28 PM     06/09/2022 05:29 PM       No results found for this or any previous visit (from the past 24 hours).    Assesment: wound infection CS 6/23/2025  Obesity - no hx of DM    Plan: cont Zosyn  Wound care changed dressing today - packed with nugauze - small opening. Family observed    My staff will schedule fu in office in one week  Rx on chart after discharge  Cont IV abx until tomorrow and anticipate further progress and discharge home

## 2025-07-02 NOTE — WOUND CARE
WOCN Note:     Follow-up visit for   New consult for CS incision   Seen in C451/01 with Tran ABARCA RN      29 y.o. y/o female admitted on 2025 from home  Admitted for incisional infection    wound infection [O86.00]  Incisional infection [T81.49XA]   History of    Past Medical History:   Diagnosis Date    Papanicolaou smear for cervical cancer screening 2024    Normal     WBC = 11.2  Lab Results   Component Value Date/Time    WBC 11.2 (H) 2025 05:01 PM    HGB 9.1 (L) 2025 05:01 PM    HCT 27.4 (L) 2025 05:01 PM     2025 05:01 PM       No indication for wound culture  On   Diet: ADULT DIET; Regular           Assessment:   Appropriately conversational. Communicative and reports no pain. Tolerated the dressing change well.    No medication given.  Mobile assists in repositioning. Able to turn independently onto back and sides.    Continent.   Surface: low air loss mattress    Bilateral heels intact without erythema.        1. POA CS incision    2.5 x 1.2 x  cm   exudate;  malodor or purulence present - gross S&S of infection  Periwound hard & wit erythema    Tx: cleanse wound inside and around with vashe, pack with iodiform gauze cover with ABD pad and tape       Recommend:    Daily dressing changes        No concerns to relay to provider.  Discussed with Kale VAZQUEZ RN and Tran VILLA RN.      Transition of Care: Plan to follow weekly and 25 as needed while admitted to hospital.     LEIGH YaN, RN  [phone]  Available via [employer]

## 2025-07-02 NOTE — PROGRESS NOTES
Bessie Mccoy is a 29 y.o. female presents for a problem visit.    No chief complaint on file.    Patient's last menstrual period was 2024 (exact date).  Birth Control: none.  Last Pap: normal obtained 24    The patient is here for  incision check, started this morning    1. Have you been to the ER, urgent care clinic, or hospitalized since your last visit? No    2. Have you seen or consulted any other health care providers outside of the [facility] since your last visit? No    Examination chaperoned by Bernie Mark LPN.  
hematuria, vaginal discharge  MSK: negative for back pain, joint pain, muscle pain  Skin: negative for itching, rash, hives  Psych: negative for anxiety, depression, change in mood      Objective:  /81   Pulse 100   Wt 115.3 kg (254 lb 3.2 oz)   BMI 42.30 kg/m²     Physical Exam:   PHYSICAL EXAMINATION    Constitutional  Appearance: well-nourished, well developed, alert, in no acute distress      Gastrointestinal  Abdominal Examination: incision healing well, erythema extending about 5cm superior to incision on the right, serosanguinous drainage  Liver and spleen: no hepatomegaly present, spleen not palpable  Hernias: no hernias identified  Skin  General Inspection: no rash, no lesions identified    Neurologic/Psychiatric  Mental Status:  Orientation: grossly oriented to person, place and time  Mood and Affect: mood normal, affect appropriate      ASSESSMENT:    ICD-10-CM    1. Incisional infection  T81.49XA           PLAN:    Incision opened to swab about 1cm. Fascia intact. Small amount of drainage. Cleaned and packed with nugauze.  Needs admission for IV antibiotics      RTO prn if symptoms persist or worsen.  Instructions given to pt.  Handouts given to pt.    Today, 07/01/25, I personally spent more than 25 minutes in review of records, documentation,  and face to face counseling with the patient discussing the diagnosis, plan of care and importance of compliance with the treatment plan and performing an exam as well as ordering any appropropriate labs, procedures, or medications.

## 2025-07-02 NOTE — PROGRESS NOTES
3:11 PM Per primary RN, Dr. Ana Arzate considering discharging this patient today if her wound packing can be changed and outpatient education provided to patient's partner. According to , patient's partner is open to performing wound care for patient with education. Wound care RN is not here today, back-up being provided by BENNIE Mares RN. This RN sent her a perfect serve requesting her see patient today in case Dr. Perez does discharge her. This RN also sent Dr. Perez a perfect serve informing her that regular wound care is not here today, will be here tomorrow, and that the goal is for her to be seen by TAYLOR Mares RN today. If TAYLOR Mares RN is not able to see her, this RN can change the wound packing, but needs direction on outpatient education as far as duration and frequency of wound care, as well as follow up plan. Dr. Perez notified of this RN's need for that information via perfect serve.     3:29 PM Per Dr. Perez, she will plan to discharge patient tomorrow. She requests that she be called when packing is removed before new packing is applied. This RN plans to pull the packing by 1630 if wound care nurse unavailable so that Dr. Perez can see wound before leaving for the day.     3:32 PM Per EBNNIE Mares RN, she is finishing up on 4th floor and will come to [facility] after. This RN will notify Dr. Perez when she is on the floor.

## 2025-07-02 NOTE — PROGRESS NOTES
1555: BENNIE Mares RN wound care nurse on unit to change patient's dressing and educate family on at home wound care. Patient tolerated dressing change well.

## 2025-07-03 VITALS
HEART RATE: 70 BPM | OXYGEN SATURATION: 96 % | SYSTOLIC BLOOD PRESSURE: 110 MMHG | RESPIRATION RATE: 16 BRPM | DIASTOLIC BLOOD PRESSURE: 78 MMHG | TEMPERATURE: 97.3 F

## 2025-07-03 PROBLEM — E66.01 MORBID OBESITY WITH BMI OF 40.0-44.9, ADULT (HCC): Status: ACTIVE | Noted: 2025-07-03

## 2025-07-03 PROBLEM — Z34.80 SUPERVISION OF OTHER NORMAL PREGNANCY, ANTEPARTUM: Status: RESOLVED | Noted: 2024-11-14 | Resolved: 2025-07-03

## 2025-07-03 PROCEDURE — 6370000000 HC RX 637 (ALT 250 FOR IP): Performed by: OBSTETRICS & GYNECOLOGY

## 2025-07-03 PROCEDURE — 96366 THER/PROPH/DIAG IV INF ADDON: CPT

## 2025-07-03 PROCEDURE — 6360000002 HC RX W HCPCS: Performed by: OBSTETRICS & GYNECOLOGY

## 2025-07-03 PROCEDURE — G0378 HOSPITAL OBSERVATION PER HR: HCPCS

## 2025-07-03 PROCEDURE — 2580000003 HC RX 258: Performed by: OBSTETRICS & GYNECOLOGY

## 2025-07-03 PROCEDURE — 94761 N-INVAS EAR/PLS OXIMETRY MLT: CPT

## 2025-07-03 RX ADMIN — IBUPROFEN 800 MG: 800 TABLET, FILM COATED ORAL at 06:26

## 2025-07-03 RX ADMIN — PIPERACILLIN AND TAZOBACTAM 3375 MG: 3; .375 INJECTION, POWDER, LYOPHILIZED, FOR SOLUTION INTRAVENOUS at 06:25

## 2025-07-03 ASSESSMENT — PAIN DESCRIPTION - LOCATION: LOCATION: ABDOMEN;INCISION

## 2025-07-03 ASSESSMENT — PAIN SCALES - GENERAL: PAINLEVEL_OUTOF10: 4

## 2025-07-03 ASSESSMENT — PAIN DESCRIPTION - DESCRIPTORS: DESCRIPTORS: SORE

## 2025-07-03 ASSESSMENT — PAIN - FUNCTIONAL ASSESSMENT: PAIN_FUNCTIONAL_ASSESSMENT: ACTIVITIES ARE NOT PREVENTED

## 2025-07-03 ASSESSMENT — PAIN DESCRIPTION - ORIENTATION: ORIENTATION: ANTERIOR

## 2025-07-03 NOTE — PROGRESS NOTES
Gynecology Progress Note    Admission day 3. No new complaints.  Pain controlled on current medication.      Vitals:  Blood pressure 112/77, pulse 66, temperature 97.5 °F (36.4 °C), temperature source Oral, resp. rate 14, SpO2 99%, unknown if currently breastfeeding.  Temp (24hrs), Av.9 °F (36.6 °C), Min:97.5 °F (36.4 °C), Max:98.1 °F (36.7 °C)    I and O: OK    Exam:  Patient without distress.               Abdomen soft,  nontender.    Incision open at right ~2-3 cm minimal surrounding erythema.  Packing in place.                Lower extremities are negative for swelling, cords, or tenderness.    Lab results: No results found for this or any previous visit (from the past 24 hours).    Assessment and Plan:   Principal Problem:    Incisional infection  Active Problems:     delivery affecting     Morbid obesity with BMI of 40.0-44.9, adult (HCC)  Resolved Problems:    * No resolved hospital problems. *     Patient improving.  Needs 1 additional dose of IV abx & can DC home with wound care/packing daily.  /family educated.    DC home with Keflex x7d  FU 1 wk.

## 2025-07-03 NOTE — PROGRESS NOTES
Bessie Mccoy is a 29 y.o. female presents for a problem visit.    Chief Complaint   Patient presents with    Follow-up     No LMP recorded.  Birth Control: none.  Last Pap: normal 7/20/2022    The patient presents for a f/u for wound care        1. Have you been to the ER, urgent care clinic, or hospitalized since your last visit? Yes    2. Have you seen or consulted any other health care providers outside of the [facility] since your last visit? No    Examination chaperoned by Damian Garcia MA.

## 2025-07-03 NOTE — CONSULTS
Session ID: [id number]  Session Duration: Longer than 52 minutes  Language: Maltese   ID: #[id number]   Name: Andreea

## 2025-07-03 NOTE — DISCHARGE SUMMARY
Gynecology Surgical Discharge Summary     Name: Bessie Mccoy MRN: [id number]  SSN: [id number]    YOB: 1995  Age: 29 y.o.  Sex: female      Admit date: 2025    Discharge Date: 7/3/2025      Attending Physician: Myesha Perez MD     Admission Diagnoses:  wound infection [O86.00]  Incisional infection [T81.49XA]    Discharge Diagnoses:  wound infection [O86.00]  Incisional infection [T81.49XA]   Principal Problem:    Incisional infection  Active Problems:     delivery affecting     Morbid obesity with BMI of 40.0-44.9, adult ([facility])  Resolved Problems:    * No resolved hospital problems. *       Procedures: * No surgery found *    Hospital Course: Normal hospital course for this procedure.  The patient was released to her home in good condition.    Significant Diagnostic Studies: No results found for this or any previous visit (from the past 24 hours).    Patient Instructions:     Diet, activity, wound care: See printed instructions.    Current Discharge Medication List        START taking these medications    Details   cephALEXin (KEFLEX) 500 MG capsule Take 1 capsule by mouth 4 times daily for 7 days  Qty: 28 capsule, Refills: 0  Start date: 2025, End date: 2025           CONTINUE these medications which have NOT CHANGED    Details   ibuprofen (ADVIL;MOTRIN) 800 MG tablet Take 1 tablet by mouth every 8 hours as needed for Pain  Qty: 30 tablet, Refills: 1      ferrous sulfate (IRON 325) 325 (65 Fe) MG tablet Take 1 tablet by mouth daily  Qty: 90 tablet, Refills: 5      Prenatal Vit-Fe Fumarate-FA (PRENATAL VITAMIN) 27-0.8 MG TABS Take 1 tablet by mouth daily  Qty: 90 tablet, Refills: 5    Comments: May substitute any prenatal vitamin with iron covered by insurance           I spent 10 minutes discharging the patient in face to face contact.  Follow-up Information       Follow up With Specialties Details Why Contact Info    Myesha Perez MD Obstetrics &

## 2025-07-03 NOTE — PROGRESS NOTES
9:11 AM: SSI kit given to patient with instructions for use.  SSI reduction education provided to patient and she verbalizes understanding.    12:55 PM: Wound care provided by this RN, with translation provided by Andreea Page (see consult Note).  was utilized for approximately 45 minutes to translate wound care education, discharge education, follow up instructions, and to answer questions from family.     13:00 PM: Wound Care    Prior to wound care, patient showered and cleansed her abdomen and pubic area (all cm-wound areas) with dial antibacterial soap. Old packing was removed, serosanguinous/yellow malodorous drainage noted on packing and a scant amount on the inside of ABD pad. Incision was cleaned in horizontal swipes from left to right above, at, and below incision in that order with new vashe soaked gauze pad for each swipe. New iodoform packing was applied with a sterile cotton tipped swab. Skin prep was applied to patient's skin on her abdomen where tape would be adhered. Clean ABD pad applied over incision, and soft skin tape applied to hold ABD in place. Family was educated on hand hygiene, wound care process and frequency. Their questions were answered via  service. Family (niece, daughter, and patient) all verbalize understanding of discharge education, wound care education, and follow up plan. Signs and symptoms of new/worsening infection discussed with patient and patient and family verbalize understanding. She knows to  her antibiotic prescription from her [facility], where to purchase new wound care supplies when her wound care supplies run out (extras given by this RN) and when to follow up with Dr. Perez, as well as when to call with concerns/questions.     14:05 PM: Copy of discharge instructions sent home with patient (in Togolese). Patient off unit in wheelchair with volunteer.

## 2025-07-11 ENCOUNTER — OFFICE VISIT (OUTPATIENT)
Age: 30
End: 2025-07-11
Payer: MEDICAID

## 2025-07-11 VITALS
DIASTOLIC BLOOD PRESSURE: 79 MMHG | BODY MASS INDEX: 41.79 KG/M2 | WEIGHT: 250.8 LBS | HEIGHT: 65 IN | SYSTOLIC BLOOD PRESSURE: 116 MMHG | HEART RATE: 71 BPM

## 2025-07-11 DIAGNOSIS — L08.9 WOUND INFECTION: Primary | ICD-10-CM

## 2025-07-11 DIAGNOSIS — T14.8XXA WOUND INFECTION: Primary | ICD-10-CM

## 2025-07-11 PROCEDURE — 99212 OFFICE O/P EST SF 10 MIN: CPT | Performed by: OBSTETRICS & GYNECOLOGY

## 2025-07-11 NOTE — CONSULTS
Session ID: [id number]  Session Duration: Longer than 53 minutes  Language: Mohawk   ID: #[id number]   Name: Khalida

## 2025-07-11 NOTE — PROGRESS NOTES
OB/GYN Problem VIsit    HPI  Bessie Mccoy is a ,  29 y.o. female who presents for a problem visit.  Patient presents today for reevaluation of wound infection that occurred approximately 2 weeks post her .  Risk factors are obesity.  She was admitted overnight last week and received IV antibiotics and was sent home on Keflex.  She is doing very well.  Denies any fever.  They are doing wound care daily at home.    No LMP recorded.  Birth Control: none.  Last Pap: normal 2022     The patient presents for a f/u for wound care       Past Medical History:   Diagnosis Date    Papanicolaou smear for cervical cancer screening 2024    Normal     Past Surgical History:   Procedure Laterality Date    APPENDECTOMY  2018     SECTION N/A 2025     SECTION performed by Myesha Perez MD at [facility] OR     Social History     Occupational History    Not on file   Tobacco Use    Smoking status: Never    Smokeless tobacco: Never   Vaping Use    Vaping status: Never Used   Substance and Sexual Activity    Alcohol use: Not Currently    Drug use: Never    Sexual activity: Not Currently     Partners: Male     Birth control/protection: None     Family History   Problem Relation Age of Onset    Hypertension Maternal Grandfather     Hypertension Mother     Asthma Father        No Known Allergies  Prior to Admission medications    Medication Sig Start Date End Date Taking? Authorizing Provider   ibuprofen (ADVIL;MOTRIN) 800 MG tablet Take 1 tablet by mouth every 8 hours as needed for Pain  Patient not taking: Reported on 2025   Myesha Perez MD   ferrous sulfate (IRON 325) 325 (65 Fe) MG tablet Take 1 tablet by mouth daily  Patient not taking: Reported on 2025 3/29/25   Myesha Perez MD   Prenatal Vit-Fe Fumarate-FA (PRENATAL VITAMIN) 27-0.8 MG TABS Take 1 tablet by mouth daily  Patient not taking: Reported on 24   Myesha Perez MD        Review of Systems:

## 2025-07-17 NOTE — PROGRESS NOTES
Bessie Mccoy is a 29 y.o. female presents for a problem visit.    Chief Complaint   Patient presents with    Follow-up     No LMP recorded.  Birth Control: none.  Last Pap: normal obtained 11/14/2024    The patient presents with c section incision check        1. Have you been to the ER, urgent care clinic, or hospitalized since your last visit? No    2. Have you seen or consulted any other health care providers outside of the [facility] since your last visit? No    Examination chaperoned by Damian Garcia MA.

## 2025-07-18 ENCOUNTER — OFFICE VISIT (OUTPATIENT)
Age: 30
End: 2025-07-18
Payer: MEDICAID

## 2025-07-18 VITALS
HEART RATE: 70 BPM | BODY MASS INDEX: 41.75 KG/M2 | WEIGHT: 250.6 LBS | DIASTOLIC BLOOD PRESSURE: 80 MMHG | SYSTOLIC BLOOD PRESSURE: 120 MMHG | HEIGHT: 65 IN

## 2025-07-18 DIAGNOSIS — T81.49XA INCISIONAL INFECTION: Primary | ICD-10-CM

## 2025-07-18 PROCEDURE — 99212 OFFICE O/P EST SF 10 MIN: CPT | Performed by: OBSTETRICS & GYNECOLOGY

## 2025-07-18 NOTE — PROGRESS NOTES
Patient is today for follow-up of wound infection.  This is being packed at home weekly.  She thought there was a rash around the incision and presented today to be evaluated.    The wound was unpacked.  There is now only a very superficial area of granulation tissue.  I advised her to clean the area daily and cover with gauze or Band-Aid.  This should close quickly.  There is no rash that is seen in the incision is otherwise well-healed.    Return to office for normal postpartum exam     was used.

## 2025-07-18 NOTE — CONSULTS
Session ID: [id number]  Session Duration: Longer than 54 minutes  Language: Macedonian   ID: #[id number]   Name: Oli

## 2025-07-28 ENCOUNTER — ROUTINE PRENATAL (OUTPATIENT)
Age: 30
End: 2025-07-28

## 2025-07-28 VITALS
WEIGHT: 249 LBS | BODY MASS INDEX: 41.48 KG/M2 | SYSTOLIC BLOOD PRESSURE: 113 MMHG | HEIGHT: 65 IN | DIASTOLIC BLOOD PRESSURE: 79 MMHG

## 2025-07-28 PROCEDURE — 0503F POSTPARTUM CARE VISIT: CPT | Performed by: OBSTETRICS & GYNECOLOGY

## 2025-07-28 NOTE — CONSULTS
Session ID: [id number]  Session Duration: 21 minutes  Language: Senegalese   ID: #[id number]   Name: Adeline

## 2025-08-06 ENCOUNTER — OFFICE VISIT (OUTPATIENT)
Age: 30
End: 2025-08-06
Payer: MEDICAID

## 2025-08-06 VITALS
DIASTOLIC BLOOD PRESSURE: 80 MMHG | RESPIRATION RATE: 18 BRPM | WEIGHT: 244.6 LBS | SYSTOLIC BLOOD PRESSURE: 130 MMHG | BODY MASS INDEX: 40.7 KG/M2 | HEART RATE: 79 BPM

## 2025-08-06 DIAGNOSIS — Z30.430 ENCOUNTER FOR IUD INSERTION: Primary | ICD-10-CM

## 2025-08-06 DIAGNOSIS — Z87.42 HISTORY OF MENORRHAGIA: ICD-10-CM

## 2025-08-06 LAB
HCG, PREGNANCY, URINE, POC: NEGATIVE
VALID INTERNAL CONTROL, POC: YES

## 2025-08-06 PROCEDURE — 81025 URINE PREGNANCY TEST: CPT | Performed by: OBSTETRICS & GYNECOLOGY

## 2025-08-06 PROCEDURE — 58300 INSERT INTRAUTERINE DEVICE: CPT | Performed by: OBSTETRICS & GYNECOLOGY

## 2025-08-06 RX ADMIN — Medication 5 ML: at 14:58

## 2025-08-20 ENCOUNTER — PATIENT MESSAGE (OUTPATIENT)
Age: 30
End: 2025-08-20

## 2025-08-20 ENCOUNTER — TELEPHONE (OUTPATIENT)
Age: 30
End: 2025-08-20

## 2025-08-20 ENCOUNTER — OFFICE VISIT (OUTPATIENT)
Age: 30
End: 2025-08-20
Payer: MEDICAID

## 2025-08-20 VITALS
DIASTOLIC BLOOD PRESSURE: 66 MMHG | SYSTOLIC BLOOD PRESSURE: 130 MMHG | BODY MASS INDEX: 40.12 KG/M2 | WEIGHT: 240.8 LBS | HEIGHT: 65 IN | HEART RATE: 82 BPM | OXYGEN SATURATION: 99 % | RESPIRATION RATE: 16 BRPM

## 2025-08-20 DIAGNOSIS — T81.49XA CELLULITIS, WOUND, POST-OPERATIVE: Primary | ICD-10-CM

## 2025-08-20 PROCEDURE — 99203 OFFICE O/P NEW LOW 30 MIN: CPT | Performed by: OBSTETRICS & GYNECOLOGY

## 2025-08-20 RX ORDER — SULFAMETHOXAZOLE AND TRIMETHOPRIM 800; 160 MG/1; MG/1
1 TABLET ORAL 2 TIMES DAILY
Qty: 28 TABLET | Refills: 0 | Status: SHIPPED | OUTPATIENT
Start: 2025-08-20 | End: 2025-09-03

## (undated) DEVICE — ELECTRODE PT RET AD L9FT HI MOIST COND ADH HYDRGEL CORDED

## (undated) DEVICE — BLADE CLIPPER GEN PURP NS

## (undated) DEVICE — CONNEXT SURGICAL MATRIX - SMALL SYRINGE: Brand: CONNEXT SURGICAL MATRIX

## (undated) DEVICE — CONNEXT SURGICAL MATRIX - LARGE SYRINGE: Brand: CONNEXT SURGICAL MATRIX

## (undated) DEVICE — SUTURE PDS II SZ 1 L36IN ABSRB VLT CT L40MM 1/2 CIR TAPR Z359T

## (undated) DEVICE — SYRINGE IRRIG 60ML SFT PLIABLE BLB EZ TO GRP 1 HND USE W/

## (undated) DEVICE — SOLUTION IV 1000ML 0.9% SOD CHL

## (undated) DEVICE — C-SECTION II-LF: Brand: MEDLINE INDUSTRIES, INC.

## (undated) DEVICE — POOLE SUCTION INSTRUMENT WITH REMOVABLE SHEATH: Brand: POOLE

## (undated) DEVICE — INTENT OT USE PROVIDES A STERILE INTERFACE BETWEEN THE OPERATING ROOM SURGICAL LAMPS (NON-STERILE) AND THE SURGEON OR STAFF WORKING IN THE STERILE FIELD.: Brand: ASPEN® ALC PLUS LIGHT HANDLE COVER

## (undated) DEVICE — Z DISCONTINUED USE 2220179 SUTURE VCRL SZ 0 L36IN ABSRB VLT L40MM CT 1/2 CIR J358H

## (undated) DEVICE — CATHETER URIN 16FR 30CC BLLN 2 W F LUBRI-SIL IC

## (undated) DEVICE — STAPLER SKIN H3.9MM WIRE DIA0.58MM CRWN 6.9MM 35 STPL ROT

## (undated) DEVICE — CLEANER ES TIP W2XL2IN ADH BK RADPQ FOR S STL ELECTRD

## (undated) DEVICE — TRAY,URINE METER,100% SILICONE,16FR10ML: Brand: MEDLINE

## (undated) DEVICE — TAPE SURG W3INXL10YD SFT CLTH H2O RESIST MEDIPORE H

## (undated) DEVICE — GARMENT,MEDLINE,DVT,INT,CALF,MED, GEN2: Brand: MEDLINE

## (undated) DEVICE — Z DISCONTINUED NO SUB IDED TRAY PREP DRY W/ PREM GLV 2 APPL 6 SPNG 2 UNDPD 1 OVERWRAP

## (undated) DEVICE — TOWEL,OR,DSP,ST,BLUE,STD,2/PK,40PK/CS: Brand: MEDLINE

## (undated) DEVICE — 3000CC GUARDIAN II: Brand: GUARDIAN

## (undated) DEVICE — PENCIL ES L3M ROCK SWCH S STL HEX LOK BLDE ELECTRD HOLSTER

## (undated) DEVICE — DRESSING BORDERED ADH GZ UNIV GEN USE 8INX4IN AND 6INX2IN

## (undated) DEVICE — STERILE LATEX POWDER-FREE SURGICAL GLOVESWITH NITRILE COATING: Brand: PROTEXIS

## (undated) DEVICE — Z INACTIVE NO ACTIVE SUPPLIER APPLICATOR MEDICATED 26 CC TINT HI-LITE ORNG STRL CHLORAPREP

## (undated) DEVICE — Z DUP USE 2271313 SOLIDIFIER FLUID 3000 CC ABSORB

## (undated) DEVICE — Z DISCONTINUED NO SUB IDED SPONGE LAPAROTOMY W18XL18IN WHITE STRUNG RADIOPAQUE STERILE